# Patient Record
Sex: MALE | Race: WHITE | Employment: UNEMPLOYED | URBAN - METROPOLITAN AREA
[De-identification: names, ages, dates, MRNs, and addresses within clinical notes are randomized per-mention and may not be internally consistent; named-entity substitution may affect disease eponyms.]

---

## 2021-08-08 ENCOUNTER — HOSPITAL ENCOUNTER (INPATIENT)
Age: 86
LOS: 4 days | Discharge: NURSING, REHAB OR LONG-TERM CARE | DRG: 871 | End: 2021-08-13
Attending: EMERGENCY MEDICINE | Admitting: INTERNAL MEDICINE
Payer: MEDICARE

## 2021-08-08 DIAGNOSIS — I71.019 CHRONIC DISSECTION OF THORACIC AORTA: ICD-10-CM

## 2021-08-08 DIAGNOSIS — K56.609 LARGE BOWEL OBSTRUCTION (HCC): Primary | ICD-10-CM

## 2021-08-08 DIAGNOSIS — R77.8 ELEVATED TROPONIN: ICD-10-CM

## 2021-08-08 PROCEDURE — 99283 EMERGENCY DEPT VISIT LOW MDM: CPT

## 2021-08-08 PROCEDURE — 96374 THER/PROPH/DIAG INJ IV PUSH: CPT

## 2021-08-08 RX ORDER — ONDANSETRON 2 MG/ML
4 INJECTION INTRAMUSCULAR; INTRAVENOUS ONCE
Status: COMPLETED | OUTPATIENT
Start: 2021-08-09 | End: 2021-08-09

## 2021-08-08 RX ORDER — 0.9 % SODIUM CHLORIDE 0.9 %
1000 INTRAVENOUS SOLUTION INTRAVENOUS ONCE
Status: COMPLETED | OUTPATIENT
Start: 2021-08-09 | End: 2021-08-09

## 2021-08-09 ENCOUNTER — APPOINTMENT (OUTPATIENT)
Dept: GENERAL RADIOLOGY | Age: 86
DRG: 871 | End: 2021-08-09
Payer: MEDICARE

## 2021-08-09 ENCOUNTER — APPOINTMENT (OUTPATIENT)
Dept: CT IMAGING | Age: 86
DRG: 871 | End: 2021-08-09
Payer: MEDICARE

## 2021-08-09 PROBLEM — K56.609 LARGE BOWEL OBSTRUCTION (HCC): Status: ACTIVE | Noted: 2021-08-09

## 2021-08-09 LAB
ALBUMIN SERPL-MCNC: 3.6 GM/DL (ref 3.4–5)
ALP BLD-CCNC: 196 IU/L (ref 40–128)
ALT SERPL-CCNC: 9 U/L (ref 10–40)
ANION GAP SERPL CALCULATED.3IONS-SCNC: 16 MMOL/L (ref 4–16)
AST SERPL-CCNC: 15 IU/L (ref 15–37)
BACTERIA: NEGATIVE /HPF
BASOPHILS ABSOLUTE: 0 K/CU MM
BASOPHILS RELATIVE PERCENT: 0.2 % (ref 0–1)
BILIRUB SERPL-MCNC: 0.2 MG/DL (ref 0–1)
BILIRUBIN URINE: NEGATIVE MG/DL
BLOOD, URINE: ABNORMAL
BUN BLDV-MCNC: 12 MG/DL (ref 6–23)
CALCIUM SERPL-MCNC: 8.9 MG/DL (ref 8.3–10.6)
CHLORIDE BLD-SCNC: 101 MMOL/L (ref 99–110)
CLARITY: CLEAR
CO2: 25 MMOL/L (ref 21–32)
COLOR: YELLOW
CREAT SERPL-MCNC: 0.8 MG/DL (ref 0.9–1.3)
DIFFERENTIAL TYPE: ABNORMAL
EOSINOPHILS ABSOLUTE: 0 K/CU MM
EOSINOPHILS RELATIVE PERCENT: 0.2 % (ref 0–3)
GFR AFRICAN AMERICAN: >60 ML/MIN/1.73M2
GFR NON-AFRICAN AMERICAN: >60 ML/MIN/1.73M2
GLUCOSE BLD-MCNC: 117 MG/DL (ref 70–99)
GLUCOSE, URINE: NEGATIVE MG/DL
HCT VFR BLD CALC: 48.3 % (ref 42–52)
HEMOGLOBIN: 15.8 GM/DL (ref 13.5–18)
HYALINE CASTS: 0 /LPF
IMMATURE NEUTROPHIL %: 0.5 % (ref 0–0.43)
KETONES, URINE: ABNORMAL MG/DL
LACTATE: 1.7 MMOL/L (ref 0.4–2)
LACTATE: 2.7 MMOL/L (ref 0.4–2)
LEUKOCYTE ESTERASE, URINE: NEGATIVE
LIPASE: 23 IU/L (ref 13–60)
LV EF: 53 %
LVEF MODALITY: NORMAL
LYMPHOCYTES ABSOLUTE: 1.2 K/CU MM
LYMPHOCYTES RELATIVE PERCENT: 9.7 % (ref 24–44)
MCH RBC QN AUTO: 30.7 PG (ref 27–31)
MCHC RBC AUTO-ENTMCNC: 32.7 % (ref 32–36)
MCV RBC AUTO: 93.8 FL (ref 78–100)
MONOCYTES ABSOLUTE: 0.5 K/CU MM
MONOCYTES RELATIVE PERCENT: 4 % (ref 0–4)
MUCUS: ABNORMAL HPF
NITRITE URINE, QUANTITATIVE: NEGATIVE
NUCLEATED RBC %: 0 %
PDW BLD-RTO: 14.8 % (ref 11.7–14.9)
PH, URINE: 5 (ref 5–8)
PLATELET # BLD: 267 K/CU MM (ref 140–440)
PMV BLD AUTO: 10 FL (ref 7.5–11.1)
POTASSIUM SERPL-SCNC: 3.3 MMOL/L (ref 3.5–5.1)
PROCALCITONIN: 0.04
PROTEIN UA: NEGATIVE MG/DL
RBC # BLD: 5.15 M/CU MM (ref 4.6–6.2)
RBC URINE: 5 /HPF (ref 0–3)
SEGMENTED NEUTROPHILS ABSOLUTE COUNT: 10.8 K/CU MM
SEGMENTED NEUTROPHILS RELATIVE PERCENT: 85.4 % (ref 36–66)
SODIUM BLD-SCNC: 142 MMOL/L (ref 135–145)
SPECIFIC GRAVITY UA: 1.03 (ref 1–1.03)
SQUAMOUS EPITHELIAL: <1 /HPF
TOTAL IMMATURE NEUTOROPHIL: 0.06 K/CU MM
TOTAL NUCLEATED RBC: 0 K/CU MM
TOTAL PROTEIN: 6.9 GM/DL (ref 6.4–8.2)
TRICHOMONAS: ABNORMAL /HPF
TROPONIN T: 0.01 NG/ML
TROPONIN T: 0.01 NG/ML
UNCLASSIFIED CAST: 3 /LPF
UROBILINOGEN, URINE: NEGATIVE MG/DL (ref 0.2–1)
WBC # BLD: 12.6 K/CU MM (ref 4–10.5)
WBC UA: 2 /HPF (ref 0–2)

## 2021-08-09 PROCEDURE — 2580000003 HC RX 258: Performed by: EMERGENCY MEDICINE

## 2021-08-09 PROCEDURE — 6360000002 HC RX W HCPCS: Performed by: INTERNAL MEDICINE

## 2021-08-09 PROCEDURE — 99221 1ST HOSP IP/OBS SF/LOW 40: CPT | Performed by: SURGERY

## 2021-08-09 PROCEDURE — 6360000004 HC RX CONTRAST MEDICATION: Performed by: EMERGENCY MEDICINE

## 2021-08-09 PROCEDURE — 83605 ASSAY OF LACTIC ACID: CPT

## 2021-08-09 PROCEDURE — 87086 URINE CULTURE/COLONY COUNT: CPT

## 2021-08-09 PROCEDURE — 84145 PROCALCITONIN (PCT): CPT

## 2021-08-09 PROCEDURE — 83690 ASSAY OF LIPASE: CPT

## 2021-08-09 PROCEDURE — 85025 COMPLETE CBC W/AUTO DIFF WBC: CPT

## 2021-08-09 PROCEDURE — 6360000002 HC RX W HCPCS: Performed by: EMERGENCY MEDICINE

## 2021-08-09 PROCEDURE — 87040 BLOOD CULTURE FOR BACTERIA: CPT

## 2021-08-09 PROCEDURE — 99222 1ST HOSP IP/OBS MODERATE 55: CPT | Performed by: INTERNAL MEDICINE

## 2021-08-09 PROCEDURE — 6370000000 HC RX 637 (ALT 250 FOR IP): Performed by: INTERNAL MEDICINE

## 2021-08-09 PROCEDURE — 81001 URINALYSIS AUTO W/SCOPE: CPT

## 2021-08-09 PROCEDURE — 93005 ELECTROCARDIOGRAM TRACING: CPT | Performed by: EMERGENCY MEDICINE

## 2021-08-09 PROCEDURE — 94761 N-INVAS EAR/PLS OXIMETRY MLT: CPT

## 2021-08-09 PROCEDURE — 2060000000 HC ICU INTERMEDIATE R&B

## 2021-08-09 PROCEDURE — 93306 TTE W/DOPPLER COMPLETE: CPT

## 2021-08-09 PROCEDURE — 84484 ASSAY OF TROPONIN QUANT: CPT

## 2021-08-09 PROCEDURE — 71045 X-RAY EXAM CHEST 1 VIEW: CPT

## 2021-08-09 PROCEDURE — 36415 COLL VENOUS BLD VENIPUNCTURE: CPT

## 2021-08-09 PROCEDURE — 2580000003 HC RX 258: Performed by: INTERNAL MEDICINE

## 2021-08-09 PROCEDURE — 80053 COMPREHEN METABOLIC PANEL: CPT

## 2021-08-09 PROCEDURE — 74177 CT ABD & PELVIS W/CONTRAST: CPT

## 2021-08-09 RX ORDER — ACETAMINOPHEN 500 MG
500 TABLET ORAL EVERY 6 HOURS PRN
COMMUNITY

## 2021-08-09 RX ORDER — ONDANSETRON 2 MG/ML
4 INJECTION INTRAMUSCULAR; INTRAVENOUS EVERY 6 HOURS PRN
Status: DISCONTINUED | OUTPATIENT
Start: 2021-08-09 | End: 2021-08-13 | Stop reason: HOSPADM

## 2021-08-09 RX ORDER — SODIUM CHLORIDE 9 MG/ML
25 INJECTION, SOLUTION INTRAVENOUS PRN
Status: DISCONTINUED | OUTPATIENT
Start: 2021-08-09 | End: 2021-08-09 | Stop reason: SDUPTHER

## 2021-08-09 RX ORDER — POLYETHYLENE GLYCOL 3350 17 G/17G
17 POWDER, FOR SOLUTION ORAL DAILY PRN
Status: DISCONTINUED | OUTPATIENT
Start: 2021-08-09 | End: 2021-08-13 | Stop reason: HOSPADM

## 2021-08-09 RX ORDER — SODIUM CHLORIDE 0.9 % (FLUSH) 0.9 %
5-40 SYRINGE (ML) INJECTION EVERY 12 HOURS SCHEDULED
Status: DISCONTINUED | OUTPATIENT
Start: 2021-08-09 | End: 2021-08-13 | Stop reason: HOSPADM

## 2021-08-09 RX ORDER — DOCUSATE SODIUM 100 MG/1
200 CAPSULE, LIQUID FILLED ORAL NIGHTLY
COMMUNITY

## 2021-08-09 RX ORDER — MIDODRINE HYDROCHLORIDE 5 MG/1
10 TABLET ORAL
Status: DISCONTINUED | OUTPATIENT
Start: 2021-08-09 | End: 2021-08-13 | Stop reason: HOSPADM

## 2021-08-09 RX ORDER — ATORVASTATIN CALCIUM 40 MG/1
40 TABLET, FILM COATED ORAL NIGHTLY
COMMUNITY

## 2021-08-09 RX ORDER — POTASSIUM CHLORIDE 1500 MG/1
40 TABLET, FILM COATED, EXTENDED RELEASE ORAL
COMMUNITY

## 2021-08-09 RX ORDER — SODIUM CHLORIDE 0.9 % (FLUSH) 0.9 %
5-40 SYRINGE (ML) INJECTION 2 TIMES DAILY
Status: DISCONTINUED | OUTPATIENT
Start: 2021-08-09 | End: 2021-08-13 | Stop reason: HOSPADM

## 2021-08-09 RX ORDER — MIDODRINE HYDROCHLORIDE 5 MG/1
10 TABLET ORAL 3 TIMES DAILY
Status: ON HOLD | COMMUNITY
End: 2021-08-13 | Stop reason: SDUPTHER

## 2021-08-09 RX ORDER — POLYETHYLENE GLYCOL 3350 17 G/17G
17 POWDER, FOR SOLUTION ORAL 2 TIMES DAILY
COMMUNITY

## 2021-08-09 RX ORDER — SERTRALINE HYDROCHLORIDE 25 MG/1
25 TABLET, FILM COATED ORAL DAILY
COMMUNITY

## 2021-08-09 RX ORDER — ACETAMINOPHEN 650 MG/1
650 SUPPOSITORY RECTAL EVERY 6 HOURS PRN
Status: DISCONTINUED | OUTPATIENT
Start: 2021-08-09 | End: 2021-08-13 | Stop reason: HOSPADM

## 2021-08-09 RX ORDER — AMOXICILLIN 250 MG
1 CAPSULE ORAL EVERY 12 HOURS PRN
COMMUNITY

## 2021-08-09 RX ORDER — SODIUM CHLORIDE 9 MG/ML
25 INJECTION, SOLUTION INTRAVENOUS PRN
Status: DISCONTINUED | OUTPATIENT
Start: 2021-08-09 | End: 2021-08-13 | Stop reason: HOSPADM

## 2021-08-09 RX ORDER — PHENYTOIN SODIUM 100 MG/1
200 CAPSULE, EXTENDED RELEASE ORAL 2 TIMES DAILY
Status: ON HOLD | COMMUNITY
End: 2021-08-13

## 2021-08-09 RX ORDER — MIRTAZAPINE 15 MG/1
15 TABLET, FILM COATED ORAL NIGHTLY
COMMUNITY

## 2021-08-09 RX ORDER — SODIUM CHLORIDE AND POTASSIUM CHLORIDE .9; .15 G/100ML; G/100ML
SOLUTION INTRAVENOUS CONTINUOUS
Status: DISCONTINUED | OUTPATIENT
Start: 2021-08-09 | End: 2021-08-13 | Stop reason: HOSPADM

## 2021-08-09 RX ORDER — NITROGLYCERIN 0.4 MG/1
0.4 TABLET SUBLINGUAL EVERY 5 MIN PRN
COMMUNITY

## 2021-08-09 RX ORDER — SODIUM CHLORIDE 0.9 % (FLUSH) 0.9 %
5-40 SYRINGE (ML) INJECTION PRN
Status: DISCONTINUED | OUTPATIENT
Start: 2021-08-09 | End: 2021-08-13 | Stop reason: HOSPADM

## 2021-08-09 RX ORDER — ACETAMINOPHEN 325 MG/1
650 TABLET ORAL EVERY 6 HOURS PRN
Status: DISCONTINUED | OUTPATIENT
Start: 2021-08-09 | End: 2021-08-13 | Stop reason: HOSPADM

## 2021-08-09 RX ORDER — VANCOMYCIN 1.75 G/350ML
1250 INJECTION, SOLUTION INTRAVENOUS ONCE
Status: COMPLETED | OUTPATIENT
Start: 2021-08-09 | End: 2021-08-09

## 2021-08-09 RX ORDER — 0.9 % SODIUM CHLORIDE 0.9 %
500 INTRAVENOUS SOLUTION INTRAVENOUS ONCE
Status: COMPLETED | OUTPATIENT
Start: 2021-08-09 | End: 2021-08-09

## 2021-08-09 RX ORDER — VANCOMYCIN 1.5 G/300ML
1500 INJECTION, SOLUTION INTRAVENOUS ONCE
Status: DISCONTINUED | OUTPATIENT
Start: 2021-08-09 | End: 2021-08-09

## 2021-08-09 RX ORDER — ONDANSETRON 4 MG/1
4 TABLET, FILM COATED ORAL EVERY 8 HOURS PRN
COMMUNITY

## 2021-08-09 RX ORDER — ONDANSETRON 4 MG/1
4 TABLET, ORALLY DISINTEGRATING ORAL EVERY 8 HOURS PRN
Status: DISCONTINUED | OUTPATIENT
Start: 2021-08-09 | End: 2021-08-13 | Stop reason: HOSPADM

## 2021-08-09 RX ORDER — CHOLECALCIFEROL (VITAMIN D3) 1250 MCG
1 CAPSULE ORAL
COMMUNITY

## 2021-08-09 RX ORDER — BISACODYL 10 MG
10 SUPPOSITORY, RECTAL RECTAL PRN
COMMUNITY

## 2021-08-09 RX ORDER — VANCOMYCIN 1 G/200ML
1000 INJECTION, SOLUTION INTRAVENOUS EVERY 24 HOURS
Status: DISCONTINUED | OUTPATIENT
Start: 2021-08-10 | End: 2021-08-11

## 2021-08-09 RX ADMIN — SODIUM CHLORIDE, PRESERVATIVE FREE 10 ML: 5 INJECTION INTRAVENOUS at 04:30

## 2021-08-09 RX ADMIN — SODIUM CHLORIDE, PRESERVATIVE FREE 10 ML: 5 INJECTION INTRAVENOUS at 08:27

## 2021-08-09 RX ADMIN — POTASSIUM CHLORIDE AND SODIUM CHLORIDE: 900; 150 INJECTION, SOLUTION INTRAVENOUS at 20:10

## 2021-08-09 RX ADMIN — SODIUM CHLORIDE 500 ML: 9 INJECTION, SOLUTION INTRAVENOUS at 04:28

## 2021-08-09 RX ADMIN — VANCOMYCIN 1250 MG: 1.75 INJECTION, SOLUTION INTRAVENOUS at 06:42

## 2021-08-09 RX ADMIN — ONDANSETRON 4 MG: 2 INJECTION INTRAMUSCULAR; INTRAVENOUS at 01:18

## 2021-08-09 RX ADMIN — MIDODRINE HYDROCHLORIDE 10 MG: 5 TABLET ORAL at 17:06

## 2021-08-09 RX ADMIN — MIDODRINE HYDROCHLORIDE 10 MG: 5 TABLET ORAL at 08:26

## 2021-08-09 RX ADMIN — MIDODRINE HYDROCHLORIDE 10 MG: 5 TABLET ORAL at 12:07

## 2021-08-09 RX ADMIN — SODIUM CHLORIDE 1000 ML: 9 INJECTION, SOLUTION INTRAVENOUS at 01:18

## 2021-08-09 RX ADMIN — CEFEPIME HYDROCHLORIDE 1000 MG: 1 INJECTION, POWDER, FOR SOLUTION INTRAMUSCULAR; INTRAVENOUS at 17:06

## 2021-08-09 RX ADMIN — FOSPHENYTOIN SODIUM 200 MG PE: 50 INJECTION, SOLUTION INTRAMUSCULAR; INTRAVENOUS at 09:17

## 2021-08-09 RX ADMIN — CEFEPIME HYDROCHLORIDE 2000 MG: 2 INJECTION, POWDER, FOR SOLUTION INTRAVENOUS at 04:25

## 2021-08-09 RX ADMIN — POTASSIUM CHLORIDE AND SODIUM CHLORIDE: 900; 150 INJECTION, SOLUTION INTRAVENOUS at 06:46

## 2021-08-09 RX ADMIN — SODIUM CHLORIDE, PRESERVATIVE FREE 10 ML: 5 INJECTION INTRAVENOUS at 08:34

## 2021-08-09 RX ADMIN — FOSPHENYTOIN SODIUM 200 MG PE: 50 INJECTION, SOLUTION INTRAMUSCULAR; INTRAVENOUS at 20:19

## 2021-08-09 RX ADMIN — IOPAMIDOL 75 ML: 755 INJECTION, SOLUTION INTRAVENOUS at 02:30

## 2021-08-09 NOTE — PROGRESS NOTES
[address] [facility]  consulted by Dr. Anais Gleason for monitoring and adjustment. Indication for treatment: Sepsis of unknown etiology  Goal trough: 15 mcg/mL  AUC/RODRIGUE: 400-600    Pertinent Laboratory Values:   Temp Readings from Last 3 Encounters:   08/09/21 98 °F (36.7 °C) (Oral)     Recent Labs     08/09/21  0114   WBC 12.6*   LACTATE 2.7*     Recent Labs     08/09/21  0114   BUN 12   CREATININE 0.8*     Estimated Creatinine Clearance: 51 mL/min (A) (based on SCr of 0.8 mg/dL (L)). No intake or output data in the 24 hours ending 08/09/21 0654    Pertinent Cultures:  Date    Source    Results  8/09               Urine    Ordered  8/09               Blood    Ordered    Vancomycin level:   TROUGH:  No results for input(s): VANCOTROUGH in the last 72 hours. RANDOM:  No results for input(s): VANCORANDOM in the last 72 hours. Assessment:  WBC and temperature: WBC elevated, afebrile  SCr, BUN, and urine output: stable  Day(s) of therapy:1  Vancomycin concentration: Pending    Plan:  Vancomycin 1250 mg x 1, followed by 1000 mg IVPB q24h  Pharmacy will continue to monitor patient and adjust therapy as indicated    Paulyu 3 8/11/21 @6130    Thank you for the consult.   Rajesh Paz, [facility], [facility]   8/9/2021 6:54 AM

## 2021-08-09 NOTE — H&P
HISTORY AND PHYSICAL  (Hospitalist, Internal Medicine)  IDENTIFYING INFORMATION   PATIENT:  Douglas Brunner  MRN:  [id number]  ADMIT DATE: 8/8/2021      CHIEF COMPLAINT   Transferred from [facility] for nausea, vomiting, diarrhea. HISTORY OF PRESENT ILLNESS   Douglas Brunner is a 80 y.o. male with coronary artery disease s/p CABG (2001LIMA to LAD, SVG to OM1, SVG to OM2) , chronic diastolic CHF, paroxysmal atrial fibrillation, multiple CVA, possible PFO and left atrial appendage thrombus on DANIELLA (2016), moderate aortic stenosis, seizure disorder, chronic hypotension, hyperlipidemia, dementia, major depressive disorder, currently at [facility] was sent to ED for nausea, vomiting, diarrhea. Patient is unable to provide any information. Reported he is doing fine. Denied any chest pain, shortness of breath, complaint of abdominal pain-pointing out to his epigastric area, denied any nausea, vomiting, denied any urinary complaints, denied any constipation or diarrhea. Patient is oriented to self, though understands questions and follows commands. Did not know that he is in the hospital, did not know the year, month, date. Knew his month and date of birth, did not know the year. Could recall his son and daughter's name. Thought his wife was in the room and was trying to talk to her. Called [facility]-no response. Also tried to call Pt's daughter Jarrell Johns [phone] on the paperwork from [facility]-left a voice message). Also tried calling multiple numbers which were documented during a cardiology office visit-(4/7/2021-[facility])-but was unsuccessful. Initial vitals in ED-BP 92/69, HR 98, RR 15, temp 99.8, saturating 92% on room air. Lab work significant for potassium 3.3, lactic acid 2.7, random glucose 117, troponin 0 0.012, WBC 12.6.   Chest x-ray-markedly distended air-filled loops of bowel within the upper abdomen. CT abdomen/pelvis-circumferential thickening of the rectum concerning for neoplasm-resulting in large bowel obstruction. 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 Urine analysis pending, blood cultures drawn in ED. Patient received IV fluids, vancomycin, cefepime. Dr Leeanne Torres was consulted from ED. PAST MEDICAL HISTORY PAST SURGICAL HISTORY    coronary artery disease s/p CABG (2001LIMA to LAD, SVG to OM1, SVG to OM2) , chronic diastolic CHF, paroxysmal atrial fibrillation, multiple CVA, possible PFO and left atrial appendage thrombus on DANIELLA (2016), moderate aortic stenosis, seizure disorder, chronic hypotension, hyperlipidemia, dementia, major depressive disorder, currently at [facility] CABG, Right inguinal herniorrhaphy with mesh-2009   FAMILY HISTORY SOCIAL HISTORY   Noncontributory   patient denied smoking, alcohol, illicit drug use   MEDICATIONS ALLERGIES    Reviewed medications from NH-is on vitamin D3 5000 units daily, midodrine 10 mg 3 times daily, sertraline 25 mg daily, MiraLAX twice daily, senna docusate twice daily as needed, atorvastatin 40 mg nightly, Eliquis 5 mg twice daily, mirtazapine 50 mg nightly, phenytoin 200 mg twice daily, potassium chloride 40 mEq daily, milk of mag as needed. Nabumetone, oxycodone.        PAST MEDICAL, SURGICAL, FAMILY, and SOCIAL HISTORY         Past Medical History:   Diagnosis Date    Alzheimer disease ([facility] [address]     AMS (altered mental status)     Arthritis     CAD (coronary artery disease)     CHF (congestive heart failure) (HCC)     Depression     Difficulty walking     Encephalopathy     Gastroenteritis     Hyperlipidemia     Hypertension     Hypoxemia     Ileus (HCC)     Malnutrition (HCC)     Muscle weakness     Osteoporosis     Seizures (HCC)     TIA (transient ischemic attack)      No past surgical history on file. No family history on file. Family Hx of HTN  Family Hx as reviewed above, otherwise non-contributory  Social History     Socioeconomic History    Marital status: Single     Spouse name: Not on file    Number of children: Not on file    Years of education: Not on file    Highest education level: Not on file   Occupational History    Not on file   Tobacco Use    Smoking status: Not on file   Substance and Sexual Activity    Alcohol use: Not on file    Drug use: Not on file    Sexual activity: Not on file   Other Topics Concern    Not on file   Social History Narrative    Not on file     Social Determinants of Health     Financial Resource Strain:     Difficulty of Paying Living Expenses:    Food Insecurity:     Worried About Running Out of Food in the Last Year:     [address] in the Last Year:    Transportation Needs:     Lack of Transportation (Medical):  Lack of Transportation (Non-Medical):    Physical Activity:     Days of Exercise per Week:     Minutes of Exercise per Session:    Stress:     Feeling of Stress :    Social Connections:     Frequency of Communication with Friends and Family:     Frequency of Social Gatherings with Friends and Family:     Attends [employer] Services:     Active Member of Clubs or Organizations:     Attends Club or Organization Meetings:     Marital Status:    Intimate Partner Violence:     Fear of Current or Ex-Partner:     Emotionally Abused:     Physically Abused:     Sexually Abused:        MEDICATIONS   Medications Prior to Admission  Not in a hospital admission.     Current Medications  Current Facility-Administered Medications   Medication Dose Route Frequency Provider Last Rate Last Admin    sodium chloride flush 0.9 % injection 5-40 mL  5-40 mL Intravenous BID Renata Snowden MD        0.9 % sodium chloride bolus  500 mL Intravenous Once Renata Snwoden MD        sodium chloride flush 0.9 % injection 5-40 mL  5-40 mL Intravenous 2 times per day Pierre Kent MD        sodium chloride flush 0.9 % injection 5-40 mL  5-40 mL Intravenous PRN Pierre Kent MD        0.9 % sodium chloride infusion  25 mL Intravenous PRN Pierre Kent MD        cefepime (MAXIPIME) 2000 mg IVPB minibag  2,000 mg Intravenous Once Pierre Kent MD        vancomycin (VANCOCIN) 25 mg/kg in dextrose 5 % 250 mL IVPB  25 mg/kg Intravenous Once Pierre Kent MD         Current Outpatient Medications   Medication Sig Dispense Refill    Cholecalciferol (VITAMIN D3) 125 MCG (5000 UT) TABS Take 1 tablet by mouth daily      ondansetron (ZOFRAN) 4 MG tablet Take 4 mg by mouth every 8 hours as needed for Nausea or Vomiting      midodrine (PROAMATINE) 5 MG tablet Take 10 mg by mouth 3 times daily      docusate sodium (COLACE) 100 MG capsule Take 200 mg by mouth nightly      sertraline (ZOLOFT) 25 MG tablet Take 25 mg by mouth daily      polyethylene glycol (MIRALAX) 17 g PACK packet Take 17 g by mouth 2 times daily      senna-docusate (PERICOLACE) 8.6-50 MG per tablet Take 1 tablet by mouth every 12 hours as needed for Constipation      atorvastatin (LIPITOR) 40 MG tablet Take 40 mg by mouth nightly      apixaban (ELIQUIS) 5 MG TABS tablet Take by mouth 2 times daily      mirtazapine (REMERON) 15 MG tablet Take 15 mg by mouth nightly      phenytoin (DILANTIN) 100 MG ER capsule Take by mouth 2 times daily      potassium chloride (KLOR-CON M) 20 MEQ TBCR extended release tablet Take 40 mEq by mouth daily (with breakfast)      nitroGLYCERIN (NITROSTAT) 0.4 MG SL tablet Place 0.4 mg under the tongue every 5 minutes as needed for Chest pain up to max of 3 total doses. If no relief after 1 dose, call 911.       magnesium hydroxide (MILK OF MAGNESIA) 400 MG/5ML suspension Take 30 mLs by mouth daily as needed for Constipation      acetaminophen (TYLENOL) 500 MG tablet Take 500 mg by mouth every 6 hours as needed for Pain      bisacodyl (DULCOLAX) 10 MG suppository Place 10 mg rectally as needed for Constipation (if day 4 with no BM)           Allergies  Allergies   Allergen Reactions    Nabumetone Other (See Comments)     unknown    Oxycodone Other (See Comments)     unknown       REVIEW OF SYSTEMS   10 point review of systems conducted and pertinent positives and negatives as per HPI. Patient is oriented to self and hence review of systems might be unreliable. PHYSICAL EXAM     Wt Readings from Last 3 Encounters:   No data found for Wt       Blood pressure 92/69, pulse 98, temperature 99.8 °F (37.7 °C), resp. rate 15, SpO2 92 %. GEN  -Awake, alert, NAD.   EYES   -PERRL. HENT  -MM are dry. RESP  -LS CTA equal bilat, no wheezes, rales or rhonchi. Symmetric chest movement. No respiratory distress noted. C/V  -S1/S2 auscultated, tachycardia without appreciable M/R/G. No JVD or carotid bruits. Peripheral pulses equal bilaterally and palpable. No peripheral edema. No reproducible chest wall tenderness. GI  -Abdomen is soft, distended, no significant tenderness. No masses or guarding. + BS in all quadrants. Rectal exam deferred.   -No CVA tenderness. Pierre catheter is not present. MS  -B/L extremities - No gross joint deformities. No swelling, intact sensation symmetrical.   SKIN  -Normal coloration, warm, dry. NEURO  - Awake, alert, oriented to self, moving all extremities, following commands. PSYC  - Appropriate affect. LABS AND IMAGING     Results for Chintan Gerber (MRN [id number]) as of 8/9/2021 03:37   Ref.  Range 8/9/2021 01:14   Sodium Latest Ref Range: 135 - 145 MMOL/L 142   Potassium Latest Ref Range: 3.5 - 5.1 MMOL/L 3.3 (L)   Chloride Latest Ref Range: 99 - 110 mMol/L 101   CO2 Latest Ref Range: 21 - 32 MMOL/L 25   BUN Latest Ref Range: 6 - 23 MG/DL 12   Creatinine Latest Ref Range: 0.9 - 1.3 MG/DL 0.8 (L)   Anion Gap Latest Ref Range: 4 - 16  16   GFR Non- Latest Ref Range: >60 mL/min/1.73m2 >60   GFR African American Latest Ref Range: >60 mL/min/1.73m2 >60   Lactate, ser/plas Latest Ref Range: 0.4 - 2.0 mMOL/L 2.7 (HH)   Glucose Latest Ref Range: 70 - 99 MG/ (H)   Calcium Latest Ref Range: 8.3 - 10.6 MG/DL 8.9   Total Protein Latest Ref Range: 6.4 - 8.2 GM/DL 6.9   Troponin T Latest Ref Range: <0.01 NG/ML 0.012 (H)   Albumin Latest Ref Range: 3.4 - 5.0 GM/DL 3.6   Alk Phos Latest Ref Range: 40 - 128 IU/L 196 (H)   ALT Latest Ref Range: 10 - 40 U/L 9 (L)   AST Latest Ref Range: 15 - 37 IU/L 15   Bilirubin Latest Ref Range: 0.0 - 1.0 MG/DL 0.2   Lipase Latest Ref Range: 13 - 60 IU/L 23   WBC Latest Ref Range: 4.0 - 10.5 K/CU MM 12.6 (H)   RBC Latest Ref Range: 4.6 - 6.2 M/CU MM 5.15   Hemoglobin Quant Latest Ref Range: 13.5 - 18.0 GM/DL 15.8   Hematocrit Latest Ref Range: 42 - 52 % 48.3   MCV Latest Ref Range: 78 - 100 FL 93.8   MCH Latest Ref Range: 27 - 31 PG 30.7   MCHC Latest Ref Range: 32.0 - 36.0 % 32.7   MPV Latest Ref Range: 7.5 - 11.1 FL 10.0   RDW Latest Ref Range: 11.7 - 14.9 % 14.8   Platelet Count Latest Ref Range: 140 - 440 K/CU    Lymphocyte % Latest Ref Range: 24 - 44 % 9.7 (L)   Monocytes % Latest Ref Range: 0 - 4 % 4.0   Eosinophils % Latest Ref Range: 0 - 3 % 0.2   Basophils % Latest Ref Range: 0 - 1 % 0.2   Lymphocytes Absolute Latest Units: K/CU MM 1.2   Monocytes Absolute Latest Units: K/CU MM 0.5   Eosinophils Absolute Latest Units: K/CU MM 0.0   Basophils Absolute Latest Units: K/CU MM 0.0   Differential Type Unknown AUTOMATED DIFFERENTIAL   Segs Relative Latest Ref Range: 36 - 66 % 85.4 (H)   Segs Absolute Latest Units: K/CU MM 10.8   Nucleated RBC % Latest Units: % 0.0   Immature Neutrophil % Latest Ref Range: 0 - 0.43 % 0.5 (H)   Total Immature Neutrophil Latest Units: K/CU MM 0.06   Total Nucleated RBC Latest Units: K/CU MM 0.0     Recent Imaging    CT ABDOMEN PELVIS W IV CONTRAST Additional Contrast? None [[id number]] Collected: 08/09/21 0245      Order Status: Completed Updated: 08/09/21 0303     Impression:       1. There is circumferential thickening of the rectum, concerning for a   neoplasm.  This results in large bowel obstruction. 2. 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 3. Focal calcified atherosclerotic plaque involving the aorta at the level of   the SMA resulting in severe stenosis.  The SMA is patent. 4. Severe stenosis is seen involving the proximal bilateral common iliac   arteries.      XR CHEST PORTABLE [[id number]] Collected: 08/09/21 0039     Order Status: Completed Updated: 08/09/21 0044     Narrative:       EXAMINATION:   ONE XRAY VIEW OF THE CHEST     8/8/2021 6:34 pm     COMPARISON:   None. HISTORY:   ORDERING SYSTEM PROVIDED HISTORY: distended abdomen   TECHNOLOGIST PROVIDED HISTORY:   Reason for exam:->distended abdomen   Reason for Exam: distended abdomen   Acuity: Acute   Type of Exam: Initial     FINDINGS:   Marginal inspiration is present.  Linear opacity is present within the left   lung base, suggesting atelectasis or scarring.  Heart size is normal full   level of inspiration.  No pneumothorax is present.  Vascular markings are   distinct.  Patient is status post prior CABG.  Osseous structures are   osteopenic.  Multiple air filled, distended loops of bowel are present within   the upper abdomen.  Colonic interposition is noted beneath the right   hemidiaphragm.      Impression:       1. Markedly distended air-filled loops of bowel within the upper abdomen.    Dedicated imaging of the abdomen pelvis recommended as concern is for   obstruction.          Relevant labs and imaging reviewed    ASSESSMENT AND PLAN     #.  Bowel obstruction:  -Abdomen x-ray-markedly distended air-filled loops of bowel within the upper abdomen  -CT abdomen/pelvis-circumferential thickening of the rectum concerning for neoplasm-resulting in large bowel obstruction.  -Patient not actively vomiting, though has abdominal distention, denied any pain, not tender on palpation.  -Dr Dolly Padgett consulted from ED-who recommended consulting the GI. -GI consult placed.  -Patient to be n.p.o. until evaluated by general surgery, GI  -Continue IV fluids, antiemetics, analgesics as needed. #.  SIRS positive :  -Patient tachycardic, has leukocytosis, temp 99.8, lactic acidosis  -Chest x-ray-no acute infiltrate, blood cultures drawn in ER.   -UA pending.   -Continue broad-spectrum antibiotics empirically.   -Procalcitonin ordered. #.  Detectable troponin:  -Patient denied any chest pain  -Troponin 0.012, EKG with no acute ST-T wave changes.  -Serial troponins, repeat EKG  -2D echo ordered  -Consult cardiology. #.  Chronic type B dissection  -CT abdomen/pelvis with contrast reported- focal thrombosis likely chronic type B dissection involving the distal descending thoracic aorta. Focal calcified atherosclerotic plaque involving the aorta at the level of the SMA resulting in severe stenosis. -CTS consult    #. Peripheral arterial disease:  -CT abdomen/pelvis with IV contrast also reported severe stenosis is seen involving the proximal bilateral common iliac arteries. #.  Hypokalemia: Replace    #. coronary artery disease s/p CABG (2001LIMA to LAD, SVG to OM1, SVG to OM2)  -Continue aspirin, statin    #. chronic diastolic CHF  -Does not appear to be in exacerbation    #. paroxysmal atrial fibrillation  -As per care everywhere-cardiology documentation-4/7/2021  -Patient is on Eliquis  -Hold until evaluated by cardiology, general surgery. #. multiple CVA-does not appear to be having any focal deficits-moving all extremities. -PT evaluation when appropriate.     #. possible PFO and left atrial appendage thrombus on DANIELLA (2016), moderate aortic stenosis  -As per cardiology documentation-4/7/2021    #. seizure disorder  -Seizure precautions  -Continue IV phenytoin 200 mg twice daily    #. chronic hypotension  -Patient on midodrine 10 mg 3 times daily    #. Hyperlipidemia-atorvastatin    #. Dementia    #. major depressive disorder-patient on sertraline    #. currently at [address]    DVT Prophylaxis: Hold until evaluated by consultants.   GI Prophylaxis: Protonix  Code Status: Limited-as per NH documentation      Case d/w ED physician    Heidi Quiñones MD  Hospitalist, Internal Medicine  8/9/2021 at 3:50 AM

## 2021-08-09 NOTE — CARE COORDINATION
Attempted to meet with patient ; he is alert and pleasant but confused. He believes that he is at home and this CM is a caregiver. Will contact daughter Lizbeth Asencio at [phone]. Renata Stevens RN     1030 Attempted to reach daughter Lizbeth Asencio; [facility] for return call. Renata Stevens RN     3803 Attempted to contact [facility]; patient appears to be a resident. [facility] for return call. Renata Stevens RN     8068 Received call from [facility]; spoke to Lulú Petersen. Patient is LTC. If he returns unskilled- will need Covid test only. He he returns skilled (ATB, etc) will need precert.  Renata Stevens RN

## 2021-08-09 NOTE — PROGRESS NOTES
Hospital Medicine: Progress Note     [place]  7/20/1932         Admit Date: 8/8/2021   Primary Care Physician:  No primary care provider on file. BP (!) 76/53   Pulse 85   Temp 96.7 °F (35.9 °C) (Axillary)   Resp 16   Ht 5' 9\" (1.753 m)   Wt 126 lb 12.2 oz (57.5 kg)   SpO2 91%   BMI 18.72 kg/m²     Chief Complaint:    Abdominal pain, nausea and vomiting. Perpetual history:   63-year-old male who presented from [facility] with nausea, vomiting and diarrhea. He was unable to provide information. He was pointing to the epigastric region and complaining of pain. CXR showed markedly distended air-filled loops of bowel within the upper abdomen, contrast CT of the abdomen and pelvis shows circumferential thickening of the rectum concerning for neoplasm. He was admitted for further evaluation. Is currently n.p.o., awaiting GI evaluation. Interval History:    He was seen and examined at bedside, resting in bed, awake and alert, no apparent distress. He is pleasant, alert to self and place but not situation. He denies acute pain, no further nausea or vomiting at this time. Nursing has no concerns. Impression/Plan:    #Nausea and vomiting:  -Probably related to bowel obstruction.  -No longer vomiting at this time.  -Keep n.p.o. for now.  -Continue fluid hydration and electrolyte replacement. -GI was consulted, plan for colonoscopy. #Concern for infectious colitis:  -Check stool studies and C. difficile.  -Continue empiric antibiotics. #SIRS:  -Suspect GI infection.  -Unremarkable CXR. -Continue broad coverage antibiotics. -Await culture data stool studies. #Elevated troponin:  -No CP, pressure. -EKG with no acute ischemic changes.  -Reviewed 2D echocardiogram, EF of 50 to 55%. Normal LV systolic function.  -Evaluated by cardiology, no further work-up at this time.     #Chronic type B dissection:  -Reviewed CT of the abdomen pelvis.  -No acute intervention at this time.  -We will follow

## 2021-08-09 NOTE — CONSULTS
[address] [place] [address]                                  CONSULTATION    PATIENT NAME: Sil Velazquez                   :        1932  MED REC NO:   [id number]                          ROOM:         ACCOUNT NO:   [de-identified]                           ADMIT DATE: 2021  PROVIDER:     Hope Pina MD    CONSULT DATE:  2021    CHIEF COMPLAINT:  History of nausea, vomiting, diarrhea, and abnormal  CAT scan; rule out rectal neoplasm. HISTORY OF PRESENT ILLNESS:  The patient is an 66-year-old white  gentleman, a resident of [place] [address], with past medical  history significant for dementia, coronary artery disease status post  CABG, congestive heart failure, hyperlipidemia, depression,  malnutrition, seizure disorder, hypertension, osteoarthritis, TIAs, who  presented to the emergency room today with two-week history of nausea,  vomiting, and diarrhea. There is no history of hematemesis, melena or  hematochezia. The blood workup done in the emergency room comprised a  Chem profile, which was remarkable for a potassium of 3.3. LFTs were  abnormal with a total alkaline phosphatase of 196. CBC showed a WBC  count of 12.6, hemoglobin 15.8, and platelet count of 043,385. The  patient did have a CAT scan done of the abdomen and pelvis and showed  circumferential thickening of the rectum concerning for a neoplasm. The  patient was seen by the surgical consultant, Dr. Austen Lindsey, as well. The  patient was admitted for further workup and management. After talking to the patient's daughter, _____, the patient has never  had an EGD done, but did have a couple of colonoscopies done. The last  colonoscopy was more than 10 years ago and had colon polyps removed. The patient is hemodynamically stable. REVIEW OF SYSTEMS:  Cannot be assessed since the patient has altered  mental status.     PAST MEDICAL HISTORY:  Significant for history of hypertension, coronary  artery disease status post CABG, dementia, hyperlipidemia, depression,  osteoarthritis/osteoporosis, seizure disorder, and TIAs. FAMILY HISTORY:  Significant for the patient's brother diagnosed with  carcinoma of the colon, sister with carcinoma of the pancreas. MEDICATIONS:  Please refer to chart (the patient is on Eliquis). SOCIOECONOMIC HISTORY:  No history of EtOH abuse. The patient does not  smoke cigarettes. PAST SURGICAL HISTORY:  The patient has had CABG done and bilateral hip  replacements. ALLERGIES:  The patient is allergic to NABUMETONE and OXYCODONE. PHYSICAL EXAMINATION:  GENERAL:  Shows an 49-year-old white gentleman of thin build and poor  nutritional status, who is lying flat in bed, no acute distress. He is  awake and responds to verbal commands. VITAL SIGNS:  Please refer to the chart. HEENT:  Shows skull to be atraumatic. NECK:  Supple. CHEST:  Shows diminished breath sounds. HEART:  S1 and S2 are normal.  ABDOMEN:  Soft, nondistended, and nontender. Liver and spleen are not  palpable. Bowel sounds are present. RECTAL:  Exam is deferred. CNS:  Shows the patient to be awake and responds to verbal commands. The patient is moving all four extremities. MUSCULOSKELETAL SYSTEM:  Shows evidence of degenerative joint disease  changes. LABORATORY DATA:  As above mentioned. IMPRESSION:  An 49-year-old white gentleman, a resident of an extended  care facility, with;  1.  Multiple comorbidities admitted with two-week history of nausea,  vomiting, diarrhea, and abnormal CAT scan, rule out rectal neoplasm. 2.  Rule out infectious colitis. RECOMMENDATIONS:  1. Agree with present management with IV fluids. 2.  We will start the patient on clear-liquid diet, advance as  tolerated.   3.  We will check CBC, Chem profile, amylase, lipase, PT/PTT, INR in  a.m.  4.  We will also check stool studies for GI disease panel and C.  difficile toxin. 5.  The case and plan has been discussed in detail with the patient's  daughter, _____, and she wants to proceed with colonoscopy for further  workup of the rectal abnormality noted on CAT scan. However, she does  not want the patient to have any invasive surgeries.         Kee Andrade MD    D: 08/09/2021 16:18:22       T: 08/09/2021 16:20:43     [email or web address]  Job#: [id number]     Doc#: [id number]    CC:

## 2021-08-09 NOTE — CONSULTS
SURGICAL CONSULTATION    Date of Admission:  8/8/2021  Date of Consultation:  8/9/2021    PCP:  No primary care provider on file. Thank you Dr. Arron Hernandes MD very much for your consultation, it's always appreciated. I had the privilege today to see your patient Bob Wheatley. Chief Complaint / History of Present Illness:  Bob Wheatley is a very pleasant 80 y.o. male who presents with pain in the abdomen, and severe constipation, noted in the care facility where he lives. Pamdini Money demented. Padmini Money and is acute, worsening and dull compared to patient's normal condition. It is moderate in intensity for a duration of 1 week and is continuous with modifying factors increased by or worse with eating. Padmini Russ His CT revealed a large bowel obstruction, and questioning a rectal obstructing mass. . D/W Dr Nini Casanova and he will colonoscope him tomorrow and let me know, I discussed with his daughter, and she wants NO surgical interventions. .      PMH:   has a past medical history of Alzheimer disease ([address] AMS (altered mental status), Arthritis, CAD (coronary artery disease), CHF (congestive heart failure) ([address] Depression, Difficulty walking, Encephalopathy, Gastroenteritis, Hyperlipidemia, Hypertension, Hypoxemia, Ileus ([address] Malnutrition ([address] Muscle weakness, Osteoporosis, Seizures ([address] and TIA (transient ischemic attack). PSH:   has no past surgical history on file. Allergies: Allergies   Allergen Reactions    Nabumetone Other (See Comments)     unknown    Oxycodone Other (See Comments)     unknown        Home Meds:    Prior to Admission medications    Medication Sig Start Date End Date Taking?  Authorizing Provider   Cholecalciferol (VITAMIN D3) 125 MCG (5000 UT) TABS Take 1 tablet by mouth daily   Yes Historical Provider, MD   ondansetron (ZOFRAN) 4 MG tablet Take 4 mg by mouth every 8 hours as needed for Nausea or Vomiting   Yes Historical Provider, MD   midodrine (PROAMATINE) 5 MG tablet Take 10 mg by mouth 3 times daily   Yes Historical Provider, MD   docusate sodium (COLACE) 100 MG capsule Take 200 mg by mouth nightly   Yes Historical Provider, MD   sertraline (ZOLOFT) 25 MG tablet Take 25 mg by mouth daily   Yes Historical Provider, MD   polyethylene glycol (MIRALAX) 17 g PACK packet Take 17 g by mouth 2 times daily   Yes Historical Provider, MD   senna-docusate (Indra Saint) 8.6-50 MG per tablet Take 1 tablet by mouth every 12 hours as needed for Constipation   Yes Historical Provider, MD   atorvastatin (LIPITOR) 40 MG tablet Take 40 mg by mouth nightly   Yes Historical Provider, MD   apixaban (ELIQUIS) 5 MG TABS tablet Take by mouth 2 times daily   Yes Historical Provider, MD   mirtazapine (REMERON) 15 MG tablet Take 15 mg by mouth nightly   Yes Historical Provider, MD   phenytoin (DILANTIN) 100 MG ER capsule Take 200 mg by mouth 2 times daily    Yes Historical Provider, MD   potassium chloride (KLOR-CON M) 20 MEQ TBCR extended release tablet Take 40 mEq by mouth daily (with breakfast)   Yes Historical Provider, MD   nitroGLYCERIN (NITROSTAT) 0.4 MG SL tablet Place 0.4 mg under the tongue every 5 minutes as needed for Chest pain up to max of 3 total doses. If no relief after 1 dose, call 911.    Yes Historical Provider, MD   magnesium hydroxide (MILK OF MAGNESIA) 400 MG/5ML suspension Take 30 mLs by mouth daily as needed for Constipation   Yes Historical Provider, MD   acetaminophen (TYLENOL) 500 MG tablet Take 500 mg by mouth every 6 hours as needed for Pain   Yes Historical Provider, MD   bisacodyl (DULCOLAX) 10 MG suppository Place 10 mg rectally as needed for Constipation (if day 4 with no BM)   Yes Historical Provider, MD        [facility] Meds:    Current Facility-Administered Medications   Medication Dose Route Frequency Provider Last Rate Last Admin    sodium chloride flush 0.9 % injection 5-40 mL  5-40 mL Intravenous BID Pillo Lopez MD   10 mL at 08/09/21 0827    sodium chloride flush 0.9 % injection 5-40 mL  5-40 mL Intravenous 2 times per day Arron Hernandes MD   10 mL at 08/09/21 0834    sodium chloride flush 0.9 % injection 5-40 mL  5-40 mL Intravenous PRN Arron Hernandes MD        sodium chloride flush 0.9 % injection 5-40 mL  5-40 mL Intravenous 2 times per day Jose Aguila MD   10 mL at 08/09/21 0834    sodium chloride flush 0.9 % injection 5-40 mL  5-40 mL Intravenous PRN Jose Aguila MD        0.9 % sodium chloride infusion  25 mL Intravenous PRN Jose Aguila MD        ondansetron (ZOFRAN-ODT) disintegrating tablet 4 mg  4 mg Oral Q8H PRN Jose Aguila MD        Or    ondansetron (ZOFRAN) injection 4 mg  4 mg Intravenous Q6H PRN Jose Aguila MD        polyethylene glycol (GLYCOLAX) packet 17 g  17 g Oral Daily PRN Jose Aguila MD        acetaminophen (TYLENOL) tablet 650 mg  650 mg Oral Q6H PRN Jose Aguila MD        Or   [facility] acetaminophen (TYLENOL) suppository 650 mg  650 mg Rectal Q6H PRN Jose Aguila MD        0.9% NaCl with KCl 20 mEq infusion   Intravenous Continuous Jose Aguila  mL/hr at 08/09/21 0646 New Bag at 08/09/21 0646    cefepime (MAXIPIME) 1000 mg IVPB minibag  1,000 mg Intravenous Q12H Andrew Uriarte  mL/hr at 08/09/21 1706 1,000 mg at 08/09/21 1706    midodrine (PROAMATINE) tablet 10 mg  10 mg Oral TID WC Jose Aguila MD   10 mg at 08/09/21 1706    fosphenytoin (CEREBYX) 200 mg PE in dextrose 5 % 50 mL IVPB (maintenance dose)  200 mg PE Intravenous Q12H Jose Aguila MD   Stopped at 08/09/21 0947    [START ON 8/10/2021] vancomycin (VANCOCIN) 1000 mg in 200 mL IVPB  1,000 mg Intravenous Q24H Jose Aguila MD          sodium chloride      0.9% NaCl with KCl 20 mEq 100 mL/hr at 08/09/21 0646       Social History / Family History:     Social History     Socioeconomic History    Marital status: Single     Spouse name: Not on file    Number of children: Not on file    Years of education: Not on file    Highest education level: Not on file   Occupational History    Not on file   Tobacco Use    Smoking status: Not on file   Substance and Sexual Activity    Alcohol use: Not on file    Drug use: Not on file    Sexual activity: Not on file   Other Topics Concern    Not on file   Social History Narrative    Not on file     Social Determinants of Health     Financial Resource Strain:     Difficulty of Paying Living Expenses:    Food Insecurity:     Worried About Running Out of Food in the Last Year:     [address] in the Last Year:    Transportation Needs:     Lack of Transportation (Medical):  Lack of Transportation (Non-Medical):    Physical Activity:     Days of Exercise per Week:     Minutes of Exercise per Session:    Stress:     Feeling of Stress :    Social Connections:     Frequency of Communication with Friends and Family:     Frequency of Social Gatherings with Friends and Family:     Attends Restoration Services:     Active Member of Clubs or Organizations:     Attends Club or Organization Meetings:     Marital Status:    Intimate Partner Violence:     Fear of Current or Ex-Partner:     Emotionally Abused:     Physically Abused:     Sexually Abused:     No family history on file. otherwise irrelevant to this surgical issue. Review of Systems:    He is demented, and could not give me ROS. .    Physical Exam:  Vital Signs:   Vitals:    08/09/21 1800   BP: 86/66   Pulse: 74   Resp: 11   Temp:    SpO2:      Body mass index is 18.72 kg/m². General appearance: Pt Alert and awake, very pleasant, in no apparent acute distress. HEENT:  RADHA, Conjunctiva clear. EOMI, No JVDs, Bruits, Megalies: neither thyroid nor lymph nodes. Lungs: Clear to auscultation bilaterally. Heart: Regular rate and rhythm, S1, S2 normal, no murmur, rub or gallop. Abdomen: Non tender. Moderately distended. Positive bowel sounds.  No hernias noted, no masses palpable. Extremities: Warm, well perfused, no cyanosis or edema. Skin: Skin color, texture, turgor normal.  Neurologic: Grossly normal, Cranial nerves from II to XII intact, Deep tendon reflexes normal.  Lymph nodes: No palpable LNs, Cervical, groins, abdominal.  Musculoskeletal: Bilateral Upper and lower extremities ROM within normal limits. Radiologic / Imaging / TESTING  CT ABDOMEN PELVIS W IV CONTRAST Additional Contrast? None    Result Date: 8/9/2021  EXAMINATION: CT OF THE ABDOMEN AND PELVIS WITH CONTRAST, 8/9/2021 1:56 am TECHNIQUE: CT of the abdomen and pelvis was performed with the administration of intravenous contrast. Multiplanar reformatted images are provided for review. Dose modulation, iterative reconstruction, and/or weight based adjustment of the mA/kV was utilized to reduce the radiation dose to as low as reasonably achievable. COMPARISON: None HISTORY: ORDERING SYSTEM PROVIDED HISTORY:  Distended abdomen TECHNOLOGIST PROVIDED HISTORY: Reason for Exam:  Distended abdomen Additional Contrast?  None Decision Support Exception - unselect if not a suspected or confirmed emergency medical condition->Emergency Medical Condition (MA) Reason for Exam:  R/O DARRIUS FINDINGS: Lower Chest: Post CABG changes are seen. Bibasilar atelectasis is noted. Organs: The liver, spleen, adrenal glands, pancreas, and kidneys are unremarkable. GI/Bowel: There is circumferential thickening of the rectum, concerning for neoplasm. Evaluation of this area is limited by beam hardening artifact from the hardware within the bilateral hips. This results in large bowel obstruction with distended loops of large bowel with air-fluid levels. The sigmoid colon measures 8.1 cm. The appendix is normal.  Mildly distended small bowel loops are seen. Pelvis: Bladder calculi are noted. Peritoneum/Retroperitoneum: There is no evidence of free air. Extensive atherosclerotic calcifications of the aorta are present.   There is a focal type B dissection involving the distal thoracic aorta. There is severe aortic stenosis with extensive calcified plaque noted at the level of the SMA with atherosclerotic plaque noted at the origin of the SMA. The SMA is patent. Extensive calcified atherosclerotic plaque is seen extending into the bilateral common iliac arteries resulting in severe stenosis proximally. Bones/Soft Tissues: Right hip arthroplasty is noted. There has been internal fixation of the left proximal femur. 1. There is circumferential thickening of the rectum, concerning for a neoplasm. This results in large bowel obstruction. 2. 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 3. Focal calcified atherosclerotic plaque involving the aorta at the level of the SMA resulting in severe stenosis. The SMA is patent. 4. Severe stenosis is seen involving the proximal bilateral common iliac arteries. Findings were discussed with NORA MURGUIA at 3:05 a.m. on 8/9/2021. XR CHEST PORTABLE    Result Date: 8/9/2021  EXAMINATION: ONE XRAY VIEW OF THE CHEST 8/8/2021 6:34 pm COMPARISON: None. HISTORY: ORDERING SYSTEM PROVIDED HISTORY: distended abdomen TECHNOLOGIST PROVIDED HISTORY: Reason for exam:->distended abdomen Reason for Exam: distended abdomen Acuity: Acute Type of Exam: Initial FINDINGS: Marginal inspiration is present. Linear opacity is present within the left lung base, suggesting atelectasis or scarring. Heart size is normal full level of inspiration. No pneumothorax is present. Vascular markings are distinct. Patient is status post prior CABG. Osseous structures are osteopenic. Multiple air filled, distended loops of bowel are present within the upper abdomen. Colonic interposition is noted beneath the right hemidiaphragm. 1. Markedly distended air-filled loops of bowel within the upper abdomen.  Dedicated imaging of the abdomen pelvis recommended as concern is for obstruction. Echo 2D w Doppler w Color w Contrast    Result Date: 8/9/2021  Transthoracic Echocardiography Report (TTE)  Demographics   Patient Name       Katerina Lobo   Date of Study       08/09/2021   Date of Birth      07/20/1932         Gender              Male   Age                80 year(s)         Race                   Patient Number     [id number]         Room Number         2011   Visit Number       [id number]   Corporate ID       [id number]   Accession Number   [id number]         Krishnafliang Gregorio,                                                            [facility]   Ordering Physician Elana Talavera MD        Physician           MD  Procedure Type of Study   TTE procedure:ECHOCARDIOGRAM 2D W DOPPLER W COLOR W CONTRAST. Procedure Date Date: 08/09/2021 Start: 07:42 AM Study Location: Portable Technical Quality: Poor visualization due to poor acoustical window. Indications:Coronary artery disease. Patient Status: Routine Height: 69 inches Weight: 180 pounds BSA: 1.98 m2 BMI: 26.58 kg/m2 HR: 133 bpm BP: 139/105 mmHg  Conclusions   Summary  Left ventricular systolic function is normal.  Ejection fraction is visually estimated at 50-55%. Individual aortic valve leaflets are not clearly visualized. The aortic valve is heavily calcified; degree of stenosis cannot be  determined due to poor acoustical windows. Tricuspid valve is not well visualized, vegetation cannot be ruled out. No evidence of any pericardial effusion. Signature   ------------------------------------------------------------------  Electronically signed by Jarek Jeffery MD (Interpreting  physician) on 08/09/2021 at 11:10 AM  ------------------------------------------------------------------   Findings   Left Ventricle  Left ventricular systolic function is normal.  Ejection fraction is visually estimated at 50-55%.   Mild left ventricular hypertrophy. Cannot determine diastolic function due to arrhythmia. Left Atrium  Essentially normal left atrium. Right Atrium  Essentially normal right atrium. Right Ventricle  Essentially normal right ventricle. Aortic Valve  Individual aortic valve leaflets are not clearly visualized. The aortic valve is heavily calcified; degree of stenosis cannot be  determined due to poor acoustical windows. Mitral Valve  Structurally normal mitral valve. Tricuspid Valve  Tricuspid valve is not well visualized - no significant TR noted   Pulmonic Valve  The pulmonic valve was not well visualized. Pericardial Effusion  No evidence of any pericardial effusion. Pleural Effusion  No evidence of pleural effusion. Miscellaneous  The aorta is not well visualized.   M-Mode/2D Measurements & Calculations   LV Diastolic Dimension:  LV Systolic Dimension:  LA Dimension: 3.4 cmAO Root  3.66 cm                  2.65 cm                 Dimension: 3.3 cmLA Area:  LV FS:27.6 %             LV Volume Diastolic: 49 55.5 cm2  LV PW Diastolic: 2.70 cm ml  LV PW Systolic: 0.82 cm  LV Volume Systolic: 23  Septum Diastolic: 9.05   ml  cm                       LV EDV/LV EDV Index: 49 RV Diastolic Dimension:  Septum Systolic: 2.13 cm LB/42 L5RK ESV/LV ESV   3.03 cm  CO: 6.35 l/min           Index: 23 ml/12 m2  CI: 3.21 l/m*m2          EF Calculated (A4C):    LA/Aorta: 1.03                           53.1 %  LV Area Diastolic: 05.8  EF Calculated (2D):     LA volume/Index: 31 ml  cm2                      54.4 %                  /61T1  LV Area Systolic: 48.6  cm2                      LV Length: 7 cm                            LVOT: 2 cm  Doppler Measurements & Calculations   MV Peak E-Wave: 56.3 AV Peak Velocity: 202 cm/s    LVOT Peak Velocity: 84.2  cm/s                 AV Peak Gradient: 16.32 mmHg  cm/s  MV Peak A-Wave: 79.5 AV Mean Velocity: 160 cm/s    LVOT Mean Velocity: 56.1  cm/s                 AV Mean Gradient: 11 mmHg     cm/s  MV E/A Ratio: 0.71   AV VTI: 33 cm                 LVOT Peak Gradient: 3  MV Peak Gradient:    AV Area (Continuity):1.45 cm2 mmHgLVOT Mean Gradient: 1  1.27 mmHg                                          mmHg                       LVOT VTI: 15.2 cm             Estimated RVSP: 32 mmHg  MV P1/2t: 23 msec                                  Estimated RAP:3 mmHg  MVA by PHT:9.57 cm2  Estimated PASP: 29.83 mmHg   MV E' Septal                                       TR Velocity:259 cm/s  Velocity: 6.58 cm/s                                TR Gradient:26.83 mmHg  MV E' Lateral  Velocity: 8.99 cm/s  MV E/E' septal: 8.56  MV E/E' lateral:  6.26         Labs:    Recent Results (from the past 24 hour(s))   EKG 12 Lead    Collection Time: 08/09/21 12:53 AM   Result Value Ref Range    Ventricular Rate 99 BPM    Atrial Rate 99 BPM    P-R Interval 152 ms    QRS Duration 90 ms    Q-T Interval 368 ms    QTc Calculation (Bazett) 472 ms    P Axis 59 degrees    R Axis -24 degrees    T Axis 39 degrees    Diagnosis       Normal sinus rhythm  Nonspecific ST abnormality  Abnormal ECG  No previous ECGs available     CBC with Auto Diff    Collection Time: 08/09/21  1:14 AM   Result Value Ref Range    WBC 12.6 (H) 4.0 - 10.5 K/CU MM    RBC 5.15 4.6 - 6.2 M/CU MM    Hemoglobin 15.8 13.5 - 18.0 GM/DL    Hematocrit 48.3 42 - 52 %    MCV 93.8 78 - 100 FL    MCH 30.7 27 - 31 PG    MCHC 32.7 32.0 - 36.0 %    RDW 14.8 11.7 - 14.9 %    Platelets 402 965 - 953 K/CU MM    MPV 10.0 7.5 - 11.1 FL    Differential Type AUTOMATED DIFFERENTIAL     Segs Relative 85.4 (H) 36 - 66 %    Lymphocytes % 9.7 (L) 24 - 44 %    Monocytes % 4.0 0 - 4 %    Eosinophils % 0.2 0 - 3 %    Basophils % 0.2 0 - 1 %    Segs Absolute 10.8 K/CU MM    Lymphocytes Absolute 1.2 K/CU MM    Monocytes Absolute 0.5 K/CU MM    Eosinophils Absolute 0.0 K/CU MM    Basophils Absolute 0.0 K/CU MM    Nucleated RBC % 0.0 %    Total Nucleated RBC 0.0 K/CU MM    Total Immature Neutrophil 0.06 K/CU MM    Immature Neutrophil % 0.5 (H) 0 - 0.43 %   CMP    Collection Time: 08/09/21  1:14 AM   Result Value Ref Range    Sodium 142 135 - 145 MMOL/L    Potassium 3.3 (L) 3.5 - 5.1 MMOL/L    Chloride 101 99 - 110 mMol/L    CO2 25 21 - 32 MMOL/L    BUN 12 6 - 23 MG/DL    CREATININE 0.8 (L) 0.9 - 1.3 MG/DL    Glucose 117 (H) 70 - 99 MG/DL    Calcium 8.9 8.3 - 10.6 MG/DL    Albumin 3.6 3.4 - 5.0 GM/DL    Total Protein 6.9 6.4 - 8.2 GM/DL    Total Bilirubin 0.2 0.0 - 1.0 MG/DL    ALT 9 (L) 10 - 40 U/L    AST 15 15 - 37 IU/L    Alkaline Phosphatase 196 (H) 40 - 128 IU/L    GFR Non-African American >60 >60 mL/min/1.73m2    GFR African American >60 >60 mL/min/1.73m2    Anion Gap 16 4 - 16   Lipase    Collection Time: 08/09/21  1:14 AM   Result Value Ref Range    Lipase 23 13 - 60 IU/L   Lactic acid, plasma    Collection Time: 08/09/21  1:14 AM   Result Value Ref Range    Lactate 2.7 (HH) 0.4 - 2.0 mMOL/L   Troponin    Collection Time: 08/09/21  1:14 AM   Result Value Ref Range    Troponin T 0.012 (H) <0.01 NG/ML   Procalcitonin    Collection Time: 08/09/21  1:14 AM   Result Value Ref Range    Procalcitonin 0.042    Troponin    Collection Time: 08/09/21  6:40 AM   Result Value Ref Range    Troponin T 0.013 (H) <0.01 NG/ML   Urinalysis with microscopic    Collection Time: 08/09/21 10:45 AM   Result Value Ref Range    Color, UA YELLOW YELLOW    Clarity, UA CLEAR CLEAR    Glucose, Urine NEGATIVE NEGATIVE MG/DL    Bilirubin Urine NEGATIVE NEGATIVE MG/DL    Ketones, Urine MODERATE (A) NEGATIVE MG/DL    Specific Gravity, UA 1.029 1.001 - 1.035    Blood, Urine SMALL (A) NEGATIVE    pH, Urine 5.0 5.0 - 8.0    Protein, UA NEGATIVE NEGATIVE MG/DL    Urobilinogen, Urine NEGATIVE 0.2 - 1.0 MG/DL    Nitrite Urine, Quantitative NEGATIVE NEGATIVE    Leukocyte Esterase, Urine NEGATIVE NEGATIVE    RBC, UA 5 (H) 0 - 3 /HPF    WBC, UA 2 0 - 2 /HPF    Bacteria, UA NEGATIVE NEGATIVE /HPF    Squam Epithel, UA <1 /HPF    Mucus, UA RARE (A) NEGATIVE HPF    Trichomonas, UA NONE SEEN NONE SEEN /HPF    Unclassified Cast 3 /LPF    Hyaline Casts, UA 0 /LPF   Lactic Acid, Plasma    Collection Time: 08/09/21  6:10 PM   Result Value Ref Range    Lactate 1.7 0.4 - 2.0 mMOL/L       Diagnosis:  Patient Active Problem List   Diagnosis    Large bowel obstruction [facility])       Assessment & plan:  Bob Wheatley is a very pleasant 80 y.o. male presenting with constipation, a CT revealing a large bowel obstruction, and questioning a rectal obstructing mass. . D/W Dr Nini Casanova and he will colonoscope him tomorrow and let me know, I discussed with his daughter, and she wants NO surgical interventions. .. Continue NPO/Bowel rest, IV Fluids, Pain control, Nausea control. Will follow the colonoscopy and make recommendations. Thank you doctor Arron Hernandes MD very much for your consultation and for the opportunity to take care of Mr Bob Wheatley with you, I'll follow along with you and I'll update you on any new events in his care.    ____________________________________________    Raúl Bain MD, FACS, FICS    8/9/2021  7:20 PM      Patient was seen with total face to face time of 45 minutes. More than 50% of this visit was counseling and education as above in my assessment and plan section of my note.

## 2021-08-09 NOTE — ED PROVIDER NOTES
Triage Chief Complaint:   Emesis and Diarrhea      [place]:  Bob Wheatley is a 80 y.o. male that presents to the emergency department from a nursing home for nausea and diarrhea. EMS states blood pressure was mildly low. States he is confused at his baseline per EMS. No fevers or chills. . Patient is a poor historian. Apparently nursing facility was concerned for possible bowel obstruction. EMS stated that they did not provide paperwork but stated patient is a DNR CCA. Past Medical History:   Diagnosis Date    Alzheimer disease ([facility] [address]     AMS (altered mental status)     Arthritis     CAD (coronary artery disease)     CHF (congestive heart failure) (HCC)     Depression     Difficulty walking     Encephalopathy     Gastroenteritis     Hyperlipidemia     Hypertension     Hypoxemia     Ileus (HCC)     Malnutrition (HCC)     Muscle weakness     Osteoporosis     Seizures (HCC)     TIA (transient ischemic attack)      No past surgical history on file. No family history on file. Social History     Socioeconomic History    Marital status: Single     Spouse name: Not on file    Number of children: Not on file    Years of education: Not on file    Highest education level: Not on file   Occupational History    Not on file   Tobacco Use    Smoking status: Not on file   Substance and Sexual Activity    Alcohol use: Not on file    Drug use: Not on file    Sexual activity: Not on file   Other Topics Concern    Not on file   Social History Narrative    Not on file     Social Determinants of Health     Financial Resource Strain:     Difficulty of Paying Living Expenses:    Food Insecurity:     Worried About Running Out of Food in the Last Year:     [address] in the Last Year:    Transportation Needs:     Lack of Transportation (Medical):      Lack of Transportation (Non-Medical):    Physical Activity:     Days of Exercise per Week:     Minutes of Exercise per Session:    Stress:  Feeling of Stress :    Social Connections:     Frequency of Communication with Friends and Family:     Frequency of Social Gatherings with Friends and Family:     Attends Hinduism Services:     Active Member of Clubs or Organizations:     Attends Club or Organization Meetings:     Marital Status:    Intimate Partner Violence:     Fear of Current or Ex-Partner:     Emotionally Abused:     Physically Abused:     Sexually Abused:      Current Facility-Administered Medications   Medication Dose Route Frequency Provider Last Rate Last Admin    sodium chloride flush 0.9 % injection 5-40 mL  5-40 mL Intravenous BID Ken Ocampo MD        0.9 % sodium chloride bolus  500 mL Intravenous Once Ken Ocampo MD        sodium chloride flush 0.9 % injection 5-40 mL  5-40 mL Intravenous 2 times per day Ken Ocampo MD        sodium chloride flush 0.9 % injection 5-40 mL  5-40 mL Intravenous PRN Ken Ocampo MD        0.9 % sodium chloride infusion  25 mL Intravenous PRN Ken Ocampo MD        cefepime (MAXIPIME) 2000 mg IVPB minibag  2,000 mg Intravenous Once Ken Ocampo MD        vancomycin (VANCOCIN) 25 mg/kg in dextrose 5 % 250 mL IVPB  25 mg/kg Intravenous Once Ken Ocampo MD         Current Outpatient Medications   Medication Sig Dispense Refill    Cholecalciferol (VITAMIN D3) 125 MCG (5000 UT) TABS Take 1 tablet by mouth daily      ondansetron (ZOFRAN) 4 MG tablet Take 4 mg by mouth every 8 hours as needed for Nausea or Vomiting      midodrine (PROAMATINE) 5 MG tablet Take 10 mg by mouth 3 times daily      docusate sodium (COLACE) 100 MG capsule Take 200 mg by mouth nightly      sertraline (ZOLOFT) 25 MG tablet Take 25 mg by mouth daily      polyethylene glycol (MIRALAX) 17 g PACK packet Take 17 g by mouth 2 times daily      senna-docusate (PERICOLACE) 8.6-50 MG per tablet Take 1 tablet by mouth every 12 hours as needed for Constipation      atorvastatin (LIPITOR) 40 MG tablet Take 40 mg by mouth nightly      apixaban (ELIQUIS) 5 MG TABS tablet Take by mouth 2 times daily      mirtazapine (REMERON) 15 MG tablet Take 15 mg by mouth nightly      phenytoin (DILANTIN) 100 MG ER capsule Take by mouth 2 times daily      potassium chloride (KLOR-CON M) 20 MEQ TBCR extended release tablet Take 40 mEq by mouth daily (with breakfast)      nitroGLYCERIN (NITROSTAT) 0.4 MG SL tablet Place 0.4 mg under the tongue every 5 minutes as needed for Chest pain up to max of 3 total doses. If no relief after 1 dose, call 911.  magnesium hydroxide (MILK OF MAGNESIA) 400 MG/5ML suspension Take 30 mLs by mouth daily as needed for Constipation      acetaminophen (TYLENOL) 500 MG tablet Take 500 mg by mouth every 6 hours as needed for Pain      bisacodyl (DULCOLAX) 10 MG suppository Place 10 mg rectally as needed for Constipation (if day 4 with no BM)       Allergies   Allergen Reactions    Nabumetone Other (See Comments)     unknown    Oxycodone Other (See Comments)     unknown     Nursing Notes Reviewed    ROS:  Patient poor historian. Confused at baseline. .    Physical Exam:  ED Triage Vitals   Enc Vitals Group      BP       Pulse       Resp       Temp       Temp src       SpO2       Weight       Height       Head Circumference       Peak Flow       Pain Score       Pain Loc       Pain Edu? Excl. in [address]? My pulse oximetry interpretation is which is within the normal range    GENERAL APPEARANCE: Awake and alert. Cooperative. Confused at baseline. HEAD: Normocephalic. Atraumatic. EYES: EOM's grossly intact. Sclera anicteric. ENT: Mucous membranes are moist. Tolerates saliva. No trismus. NECK: Supple. No meningismus. Trachea midline. HEART: RRR. Radial pulses 2+. LUNGS: Respirations unlabored. CTAB. No wheezing or stridor. ABDOMEN: Soft. Slightly distended abdomen. No guarding or rebound. EXTREMITIES: No acute deformities. SKIN: Warm and dry.   NEUROLOGICAL: No gross facial drooping. Moves all 4 extremities spontaneously.   PSYCHIATRIC: Confused    I have reviewed and interpreted all of the currently available lab results from this visit (if applicable):  Results for orders placed or performed during the hospital encounter of 08/08/21   CBC with Auto Diff   Result Value Ref Range    WBC 12.6 (H) 4.0 - 10.5 K/CU MM    RBC 5.15 4.6 - 6.2 M/CU MM    Hemoglobin 15.8 13.5 - 18.0 GM/DL    Hematocrit 48.3 42 - 52 %    MCV 93.8 78 - 100 FL    MCH 30.7 27 - 31 PG    MCHC 32.7 32.0 - 36.0 %    RDW 14.8 11.7 - 14.9 %    Platelets 741 571 - 890 K/CU MM    MPV 10.0 7.5 - 11.1 FL    Differential Type AUTOMATED DIFFERENTIAL     Segs Relative 85.4 (H) 36 - 66 %    Lymphocytes % 9.7 (L) 24 - 44 %    Monocytes % 4.0 0 - 4 %    Eosinophils % 0.2 0 - 3 %    Basophils % 0.2 0 - 1 %    Segs Absolute 10.8 K/CU MM    Lymphocytes Absolute 1.2 K/CU MM    Monocytes Absolute 0.5 K/CU MM    Eosinophils Absolute 0.0 K/CU MM    Basophils Absolute 0.0 K/CU MM    Nucleated RBC % 0.0 %    Total Nucleated RBC 0.0 K/CU MM    Total Immature Neutrophil 0.06 K/CU MM    Immature Neutrophil % 0.5 (H) 0 - 0.43 %   CMP   Result Value Ref Range    Sodium 142 135 - 145 MMOL/L    Potassium 3.3 (L) 3.5 - 5.1 MMOL/L    Chloride 101 99 - 110 mMol/L    CO2 25 21 - 32 MMOL/L    BUN 12 6 - 23 MG/DL    CREATININE 0.8 (L) 0.9 - 1.3 MG/DL    Glucose 117 (H) 70 - 99 MG/DL    Calcium 8.9 8.3 - 10.6 MG/DL    Albumin 3.6 3.4 - 5.0 GM/DL    Total Protein 6.9 6.4 - 8.2 GM/DL    Total Bilirubin 0.2 0.0 - 1.0 MG/DL    ALT 9 (L) 10 - 40 U/L    AST 15 15 - 37 IU/L    Alkaline Phosphatase 196 (H) 40 - 128 IU/L    GFR Non-African American >60 >60 mL/min/1.73m2    GFR African American >60 >60 mL/min/1.73m2    Anion Gap 16 4 - 16   Lipase   Result Value Ref Range    Lipase 23 13 - 60 IU/L   Lactic acid, plasma   Result Value Ref Range    Lactate 2.7 (HH) 0.4 - 2.0 mMOL/L   Troponin   Result Value Ref Range    Troponin T 0.012 (H) <0.01 NG/ML   EKG 12 Lead   Result Value Ref Range    Ventricular Rate 99 BPM    Atrial Rate 99 BPM    P-R Interval 152 ms    QRS Duration 90 ms    Q-T Interval 368 ms    QTc Calculation (Bazett) 472 ms    P Axis 59 degrees    R Axis -24 degrees    T Axis 39 degrees    Diagnosis       Normal sinus rhythm  Nonspecific ST abnormality  Abnormal ECG  No previous ECGs available          Radiographs:  [] Radiologist's Wet Read Report Reviewed:      XR CHEST PORTABLE (Final result)  Result time 08/09/21 00:41:41  Final result by Jose Alejandro Whipple DO (08/09/21 00:41:41)                Impression:    1. Markedly distended air-filled loops of bowel within the upper abdomen. Dedicated imaging of the abdomen pelvis recommended as concern is for   obstruction. Narrative:    EXAMINATION:   ONE XRAY VIEW OF THE CHEST     8/8/2021 6:34 pm     COMPARISON:   None. HISTORY:   ORDERING SYSTEM PROVIDED HISTORY: distended abdomen   TECHNOLOGIST PROVIDED HISTORY:   Reason for exam:->distended abdomen   Reason for Exam: distended abdomen   Acuity: Acute   Type of Exam: Initial     FINDINGS:   Marginal inspiration is present.  Linear opacity is present within the left   lung base, suggesting atelectasis or scarring.  Heart size is normal full   level of inspiration.  No pneumothorax is present.  Vascular markings are   distinct.  Patient is status post prior CABG.  Osseous structures are   osteopenic.  Multiple air filled, distended loops of bowel are present within   the upper abdomen.  Colonic interposition is noted beneath the right   hemidiaphragm. [] Discussed with Radiologist:     [] The following radiograph was interpreted by myself in the absence of a radiologist:     EKG: (All EKG's are interpreted by myself in the absence of a cardiologist)  The Ekg interpreted by me shows  normal sinus rhythm with a rate of 99  Axis is   Normal  QTc is  normal  Intervals and Durations are unremarkable.       ST Segments: flattening in  III and aVl  No old EKG           MDM:  Patient hypotensive at 92/69. Temp is 99.8. Heart rate in the 90s. Sats 92% on room air. Started on IV fluids and zofran. EKG shows normal sinus rhythm at 99. LVH. No old EKG for comparison. . CBC shows a white count of 12.6. Hemoglobin of 15.8. Electrolytes shows a decreased potassium of 3.3. Normal kidney function. Glucose is 117. Normal CO2 25. Normal anion gap of 16. Lipase normal at 23. Lactic acid elevated at 2.7. Troponin detectable at 0.012. . CXR shows distended air-filled loops of bowel and upper abdomen. CT abdomen shows circumferential thickening of the rectum concerning for a neoplasm causing a large bowel obstruction. Focal thrombosed chronic type B dissection involving the distal descending thoracic aorta. Focal calcified atherosclerotic plaque involving the aorta at the level of the SMA that is patent. A stenosis in the proximal bilateral common iliac arteries. Will consult general surgery to be involved with case and will admit to the hospitalist.. Spoke with Dr. Missy Olivares who states he will consult on patient. Asked if I can ask hospitalist to consult GI in the morning as well do not see that he has any had any recent colonoscopies. Clinical Impression:  1. Large bowel obstruction ([address]    2. Chronic dissection of thoracic aorta (HCC)    3. Elevated troponin        Disposition Vitals:  [unfilled], [unfilled], [unfilled], [unfilled]    Disposition referral (if applicable):  No follow-up provider specified.     Disposition medications (if applicable):  New Prescriptions    No medications on file         (Please note that portions of this note may have been completed with a voice recognition program. Efforts were made to edit the dictations but occasionally words are mis-transcribed.)    MD Pierre Mccormick MD  08/09/21 0592

## 2021-08-09 NOTE — ED NOTES
Bed: H-05  Expected date:   Expected time:   Means of arrival:   Comments:  EMS     Nola Dougherty RN  08/08/21 0157

## 2021-08-09 NOTE — DISCHARGE INSTR - COC
Continuity of Care Form    Patient Name: Rayo Burton   :  1932  MRN:  [id number]    Admit date:  2021  Discharge date:  2021        Code Status Order: No Order   Advance Directives:      Admitting Physician:  No admitting provider for patient encounter. PCP: No primary care provider on file. Discharging Nurse: [facility] Unit/Room#: H05/H-05  Discharging Unit Phone Number: [phone]    Emergency Contact:   No emergency contact information on file. Past Surgical History:  No past surgical history on file. Immunization History: There is no immunization history on file for this patient. Active Problems: There is no problem list on file for this patient. Isolation/Infection:   Isolation            No Isolation          Patient Infection Status       None to display            Nurse Assessment:  Last Vital Signs: There were no vitals taken for this visit. Last documented pain score (0-10 scale):    Last Weight:   Wt Readings from Last 1 Encounters:   No data found for Wt     Mental Status:  disoriented    IV Access:  - None    Nursing Mobility/ADLs:  Walking   Dependent  Transfer  Dependent  Bathing  Dependent  Dressing  Dependent  Toileting  Dependent  Feeding  Assisted  Med Admin  Assisted  Med Delivery   whole    Wound Care Documentation and Therapy:        Elimination:  Continence: Bowel: No  Bladder: No  Urinary Catheter: None   Colostomy/Ileostomy/Ileal Conduit: No       Date of Last BM: 2021    Safety Concerns:      At Risk for Falls    Impairments/Disabilities:      Hearing    Patient's personal belongings (please select all that are sent with patient):  None    RN SIGNATURE:  Electronically signed by Mer Shankar RN on 21 at 5:26 PM EDT    CASE MANAGEMENT/SOCIAL WORK SECTION    Inpatient Status Date: ***    Readmission Risk Assessment Score:  Readmission Risk              Risk of Unplanned Readmission:  0           Discharging to Facility/ Agency   Name:   Address:  Phone:  Fax:    / signature: {Esignature:[id number]}    PHYSICIAN SECTION    Prognosis: Guarded    Condition at Discharge: Stable    Rehab Potential (if transferring to Rehab): Fair     Nutrition Therapy:  Current Nutrition Therapy:   - Oral Diet:  General    Encourage PO fluids    Routes of Feeding: Oral  Liquids: No Restrictions  Daily Fluid Restriction: no  Last Modified Barium Swallow with Video (Video Swallowing Test): not done    Treatments at the Time of Hospital Discharge:   Respiratory Treatments: none  Oxygen Therapy:  is not on home oxygen therapy. Ventilator:    - No ventilator support    Rehab Therapies: none  Weight Bearing Status/Restrictions: No weight bearing restirctions  Other Medical Equipment (for information only, NOT a DME order):  none    Recommended Labs or Other Treatments After Discharge: CBC and BMP on Monday August 16, and repeat in one week after that  -Dilantin level in one week    Physician Certification: I certify the above information and transfer of Angel Kelly  is necessary for the continuing treatment of the diagnosis listed and that he requires Intermediate Nursing Care for greater 30 days.      Update Admission H&P: No change in H&P    PHYSICIAN SIGNATURE:  Electronically signed by Sukhjinder Camarena MD on 8/13/21 at 1:33 PM EDT

## 2021-08-09 NOTE — CONSULTS
INPATIENT CARDIOLOGY CONSULT NOTE       Reason for consultation:  Elevated troponins    Referring physician:  Kate Carver MD     Primary care physician: No primary care provider on file. Dear Kate Carver MD Thank you for the consult    Chief Complaint   Patient presents with    Emesis    Diarrhea       History of present illness:Trace is a 80 y. o.year old who  presents with  Chief Complaint   Patient presents with    Emesis    Diarrhea     Patient is a 71-year-old gentleman who has underlying dementia and does not know the reason why he is admitted to the hospital.  Patient has prior history of CAD s/p CABG in 2001, history of diastolic heart failure, paroxysmal atrial fibrillation, history of stroke, left atrial appendage thrombus in 2016, moderate aortic stenosis, advanced dementia, history of seizure disorder who follows up at [facility] for cardiac needs    Patient is admitted with nausea vomiting and diarrhea  He denies any known history of CAD however it is well documented. Denies any current chest pain shortness of breath or palpitations    Cardiology is consulted for elevated troponin, which are trivial.    EKG shows sinus rhythm without any ischemic changes nonspecific ST-T changes    Past medical history:    has a past medical history of Alzheimer disease ([address] AMS (altered mental status), Arthritis, CAD (coronary artery disease), CHF (congestive heart failure) ([address] Depression, Difficulty walking, Encephalopathy, Gastroenteritis, Hyperlipidemia, Hypertension, Hypoxemia, Ileus ([address] Malnutrition ([address] Muscle weakness, Osteoporosis, Seizures ([address] and TIA (transient ischemic attack). Past surgical history:   has no past surgical history on file.   Social History:     Family history:   no family history of CAD, STROKE of DM    Allergies   Allergen Reactions    Nabumetone Other (See Comments)     unknown    Oxycodone Other (See Comments)     unknown       sodium chloride flush 0.9 % injection 5-40 mL, BID  sodium chloride flush 0.9 % injection 5-40 mL, 2 times per day  sodium chloride flush 0.9 % injection 5-40 mL, PRN  sodium chloride flush 0.9 % injection 5-40 mL, 2 times per day  sodium chloride flush 0.9 % injection 5-40 mL, PRN  0.9 % sodium chloride infusion, PRN  ondansetron (ZOFRAN-ODT) disintegrating tablet 4 mg, Q8H PRN   Or  ondansetron (ZOFRAN) injection 4 mg, Q6H PRN  polyethylene glycol (GLYCOLAX) packet 17 g, Daily PRN  acetaminophen (TYLENOL) tablet 650 mg, Q6H PRN   Or  acetaminophen (TYLENOL) suppository 650 mg, Q6H PRN  0.9% NaCl with KCl 20 mEq infusion, Continuous  cefepime (MAXIPIME) 1000 mg IVPB minibag, Q12H  midodrine (PROAMATINE) tablet 10 mg, TID WC  fosphenytoin (CEREBYX) 200 mg PE in dextrose 5 % 50 mL IVPB (maintenance dose), Q12H  [START ON 8/10/2021] vancomycin (VANCOCIN) 1000 mg in 200 mL IVPB, Q24H      Current Facility-Administered Medications   Medication Dose Route Frequency Provider Last Rate Last Admin    sodium chloride flush 0.9 % injection 5-40 mL  5-40 mL Intravenous BID Clayton Gray MD   10 mL at 08/09/21 0827    sodium chloride flush 0.9 % injection 5-40 mL  5-40 mL Intravenous 2 times per day Clayton Gray MD   10 mL at 08/09/21 0834    sodium chloride flush 0.9 % injection 5-40 mL  5-40 mL Intravenous PRN Clayton Gray MD        sodium chloride flush 0.9 % injection 5-40 mL  5-40 mL Intravenous 2 times per day Fabiana Sellers MD   10 mL at 08/09/21 0834    sodium chloride flush 0.9 % injection 5-40 mL  5-40 mL Intravenous PRN Fabiana Sellers MD        0.9 % sodium chloride infusion  25 mL Intravenous PRN Fabiana Sellers MD        ondansetron (ZOFRAN-ODT) disintegrating tablet 4 mg  4 mg Oral Q8H PRN Fabiana Sellers MD        Or    ondansetron (ZOFRAN) injection 4 mg  4 mg Intravenous Q6H PRN Fabiana Sellers MD        polyethylene glycol (GLYCOLAX) packet 17 g  17 g Oral Daily PRN Rashmi Miles MD        acetaminophen (TYLENOL) tablet 650 mg  650 mg Oral Q6H PRN Rashmi Miles MD        Or    acetaminophen (TYLENOL) suppository 650 mg  650 mg Rectal Q6H PRN Rashmi Miles MD        0.9% NaCl with KCl 20 mEq infusion   Intravenous Continuous Rashmi Miles  mL/hr at 08/09/21 0646 New Bag at 08/09/21 0646    cefepime (MAXIPIME) 1000 mg IVPB minibag  1,000 mg Intravenous Q12H Rashmi Miles MD        midodrine (PROAMATINE) tablet 10 mg  10 mg Oral TID WC Rashmi Miles MD   10 mg at 08/09/21 0826    fosphenytoin (CEREBYX) 200 mg PE in dextrose 5 % 50 mL IVPB (maintenance dose)  200 mg PE Intravenous Q12H Rashmi Miles MD   Stopped at 08/09/21 0947    [START ON 8/10/2021] vancomycin (VANCOCIN) 1000 mg in 200 mL IVPB  1,000 mg Intravenous Q24H Rashim Miles MD             Review of Systems:      Dementia  No ROS could be obtained       Physical Examination:      Vitals:    08/09/21 0827   BP:    Pulse: 89   Resp: 27   Temp: 98.1 °F (36.7 °C)   SpO2:       Wt Readings from Last 3 Encounters:   08/09/21 126 lb 12.2 oz (57.5 kg)     Body mass index is 18.72 kg/m². General Appearance:  No distress, conversant - dementia   Constitutional:  Well developed, Well nourished  HEENT:  Normocephalic, Atraumatic, Oropharynx moist, No oral exudates,   Nose normal. Neck Supple Carotid: no carotid bruit  Eyes:  Conjunctiva normal, No discharge. Respiratory:     Normal breath sounds, No respiratory distress, No wheezing, no use of accessory muscles, diaphragm movement is normal  No chest Tenderness  Cardiovascular: D8-P6 + soft systolic murmurs auscultated. No rubs, thrills or gallops. Normal  rhythm. Pedal pulses are normal. No pedal edema  GI:  Soft Non tender, non distended. :  No CVA tenderness. Musculoskeletal:   No tenderness, No cyanosis, No clubbing. Integument:  Warm, Dry, No erythema, No rash.    Lymphatic:  No lymphadenopathy noted.   Neurologic:  Alert    Psychiatric:  Affect normal       Lab Review     Recent Labs     08/09/21 0114   WBC 12.6*   HGB 15.8   HCT 48.3         Recent Labs     08/09/21 0114      K 3.3*      CO2 25   BUN 12   CREATININE 0.8*     Recent Labs     08/09/21 0114   AST 15   ALT 9*   BILITOT 0.2   ALKPHOS 196*     No results for input(s): TROPONINI in the last 72 hours. No results found for: BNP  No results found for: INR, PROTIME      All labs, images, EKGs were personally reviewed      Assessment: 80 y. o.year old with PMH of  has a past medical history of Alzheimer disease ([address] AMS (altered mental status), Arthritis, CAD (coronary artery disease), CHF (congestive heart failure) ([address] Depression, Difficulty walking, Encephalopathy, Gastroenteritis, Hyperlipidemia, Hypertension, Hypoxemia, Ileus ([address] Malnutrition ([address] Muscle weakness, Osteoporosis, Seizures ([address] and TIA (transient ischemic attack). Recommendations:      1. Elevated troponin in the setting of history of coronary disease s/p CABG. Cardiac troponins are in non-ACS trend, likely chronically elevated. In absence of chest pain and EKG changes no further work-up planned at this point. Recommend to follow-up with primary cardiologist as outpatient  2. Small bowel obstruction, possible neoplasm of the rectum on imaging. Surgery/GI follow-up  3. Dehydration/leukocytosis/lactic acidosis cultures drawn on empiric antibiotics, IV fluids  4. History of moderate aortic stenosis: Stable.   Outpatient follow-up with cardiology    No further work-up planned at this time please call us with questions  Thank you for the consult    Dr. Scarlet Mcdonough  8/9/2021 10:58 AM

## 2021-08-10 LAB
ALBUMIN SERPL-MCNC: 3.1 GM/DL (ref 3.4–5)
ALP BLD-CCNC: 140 IU/L (ref 40–129)
ALT SERPL-CCNC: 6 U/L (ref 10–40)
AMYLASE: 43 U/L (ref 25–115)
ANION GAP SERPL CALCULATED.3IONS-SCNC: 9 MMOL/L (ref 4–16)
APTT: 27.2 SECONDS (ref 25.1–37.1)
AST SERPL-CCNC: 11 IU/L (ref 15–37)
BASOPHILS ABSOLUTE: 0 K/CU MM
BASOPHILS RELATIVE PERCENT: 0.3 % (ref 0–1)
BILIRUB SERPL-MCNC: 0.1 MG/DL (ref 0–1)
BUN BLDV-MCNC: 9 MG/DL (ref 6–23)
CALCIUM SERPL-MCNC: 7.5 MG/DL (ref 8.3–10.6)
CHLORIDE BLD-SCNC: 108 MMOL/L (ref 99–110)
CO2: 24 MMOL/L (ref 21–32)
CREAT SERPL-MCNC: 0.6 MG/DL (ref 0.9–1.3)
CULTURE: NORMAL
DIFFERENTIAL TYPE: ABNORMAL
EKG ATRIAL RATE: 99 BPM
EKG DIAGNOSIS: NORMAL
EKG P AXIS: 59 DEGREES
EKG P-R INTERVAL: 152 MS
EKG Q-T INTERVAL: 368 MS
EKG QRS DURATION: 90 MS
EKG QTC CALCULATION (BAZETT): 472 MS
EKG R AXIS: -24 DEGREES
EKG T AXIS: 39 DEGREES
EKG VENTRICULAR RATE: 99 BPM
EOSINOPHILS ABSOLUTE: 0.2 K/CU MM
EOSINOPHILS RELATIVE PERCENT: 1.7 % (ref 0–3)
GFR AFRICAN AMERICAN: >60 ML/MIN/1.73M2
GFR NON-AFRICAN AMERICAN: >60 ML/MIN/1.73M2
GLUCOSE BLD-MCNC: 83 MG/DL (ref 70–99)
HCT VFR BLD CALC: 41.3 % (ref 42–52)
HEMOGLOBIN: 13.3 GM/DL (ref 13.5–18)
IMMATURE NEUTROPHIL %: 0.6 % (ref 0–0.43)
INR BLD: 1.51 INDEX
LIPASE: 28 IU/L (ref 13–60)
LYMPHOCYTES ABSOLUTE: 1.5 K/CU MM
LYMPHOCYTES RELATIVE PERCENT: 14.7 % (ref 24–44)
Lab: NORMAL
MCH RBC QN AUTO: 30.3 PG (ref 27–31)
MCHC RBC AUTO-ENTMCNC: 32.2 % (ref 32–36)
MCV RBC AUTO: 94.1 FL (ref 78–100)
MONOCYTES ABSOLUTE: 0.6 K/CU MM
MONOCYTES RELATIVE PERCENT: 5.3 % (ref 0–4)
NUCLEATED RBC %: 0 %
PDW BLD-RTO: 14.8 % (ref 11.7–14.9)
PLATELET # BLD: 204 K/CU MM (ref 140–440)
PMV BLD AUTO: 9.7 FL (ref 7.5–11.1)
POTASSIUM SERPL-SCNC: 3 MMOL/L (ref 3.5–5.1)
PROTHROMBIN TIME: 19.5 SECONDS (ref 11.7–14.5)
RBC # BLD: 4.39 M/CU MM (ref 4.6–6.2)
SEGMENTED NEUTROPHILS ABSOLUTE COUNT: 8.1 K/CU MM
SEGMENTED NEUTROPHILS RELATIVE PERCENT: 77.4 % (ref 36–66)
SODIUM BLD-SCNC: 141 MMOL/L (ref 135–145)
SPECIMEN: NORMAL
TOTAL IMMATURE NEUTOROPHIL: 0.06 K/CU MM
TOTAL NUCLEATED RBC: 0 K/CU MM
TOTAL PROTEIN: 5.1 GM/DL (ref 6.4–8.2)
WBC # BLD: 10.5 K/CU MM (ref 4–10.5)

## 2021-08-10 PROCEDURE — 94761 N-INVAS EAR/PLS OXIMETRY MLT: CPT

## 2021-08-10 PROCEDURE — 6360000002 HC RX W HCPCS: Performed by: INTERNAL MEDICINE

## 2021-08-10 PROCEDURE — 85730 THROMBOPLASTIN TIME PARTIAL: CPT

## 2021-08-10 PROCEDURE — 6370000000 HC RX 637 (ALT 250 FOR IP): Performed by: INTERNAL MEDICINE

## 2021-08-10 PROCEDURE — 36415 COLL VENOUS BLD VENIPUNCTURE: CPT

## 2021-08-10 PROCEDURE — 82150 ASSAY OF AMYLASE: CPT

## 2021-08-10 PROCEDURE — 2060000000 HC ICU INTERMEDIATE R&B

## 2021-08-10 PROCEDURE — 93010 ELECTROCARDIOGRAM REPORT: CPT | Performed by: INTERNAL MEDICINE

## 2021-08-10 PROCEDURE — 2580000003 HC RX 258: Performed by: INTERNAL MEDICINE

## 2021-08-10 PROCEDURE — 85610 PROTHROMBIN TIME: CPT

## 2021-08-10 PROCEDURE — 2580000003 HC RX 258: Performed by: EMERGENCY MEDICINE

## 2021-08-10 PROCEDURE — 85025 COMPLETE CBC W/AUTO DIFF WBC: CPT

## 2021-08-10 PROCEDURE — 83690 ASSAY OF LIPASE: CPT

## 2021-08-10 PROCEDURE — 80053 COMPREHEN METABOLIC PANEL: CPT

## 2021-08-10 PROCEDURE — 99232 SBSQ HOSP IP/OBS MODERATE 35: CPT | Performed by: SURGERY

## 2021-08-10 RX ORDER — POTASSIUM CHLORIDE 7.45 MG/ML
10 INJECTION INTRAVENOUS
Status: COMPLETED | OUTPATIENT
Start: 2021-08-10 | End: 2021-08-10

## 2021-08-10 RX ADMIN — MIDODRINE HYDROCHLORIDE 10 MG: 5 TABLET ORAL at 11:33

## 2021-08-10 RX ADMIN — MIDODRINE HYDROCHLORIDE 10 MG: 5 TABLET ORAL at 17:09

## 2021-08-10 RX ADMIN — FOSPHENYTOIN SODIUM 200 MG PE: 50 INJECTION, SOLUTION INTRAMUSCULAR; INTRAVENOUS at 19:57

## 2021-08-10 RX ADMIN — POTASSIUM CHLORIDE 10 MEQ: 7.46 INJECTION, SOLUTION INTRAVENOUS at 08:22

## 2021-08-10 RX ADMIN — FOSPHENYTOIN SODIUM 200 MG PE: 50 INJECTION, SOLUTION INTRAMUSCULAR; INTRAVENOUS at 08:21

## 2021-08-10 RX ADMIN — SODIUM CHLORIDE, PRESERVATIVE FREE 10 ML: 5 INJECTION INTRAVENOUS at 08:24

## 2021-08-10 RX ADMIN — POTASSIUM CHLORIDE AND SODIUM CHLORIDE: 900; 150 INJECTION, SOLUTION INTRAVENOUS at 06:20

## 2021-08-10 RX ADMIN — SODIUM CHLORIDE, PRESERVATIVE FREE 10 ML: 5 INJECTION INTRAVENOUS at 08:23

## 2021-08-10 RX ADMIN — MIDODRINE HYDROCHLORIDE 10 MG: 5 TABLET ORAL at 08:06

## 2021-08-10 RX ADMIN — POTASSIUM CHLORIDE 10 MEQ: 7.46 INJECTION, SOLUTION INTRAVENOUS at 10:05

## 2021-08-10 RX ADMIN — CEFEPIME HYDROCHLORIDE 1000 MG: 1 INJECTION, POWDER, FOR SOLUTION INTRAMUSCULAR; INTRAVENOUS at 03:55

## 2021-08-10 RX ADMIN — POTASSIUM CHLORIDE 10 MEQ: 7.46 INJECTION, SOLUTION INTRAVENOUS at 13:05

## 2021-08-10 RX ADMIN — CEFEPIME HYDROCHLORIDE 1000 MG: 1 INJECTION, POWDER, FOR SOLUTION INTRAMUSCULAR; INTRAVENOUS at 17:09

## 2021-08-10 RX ADMIN — POTASSIUM CHLORIDE 10 MEQ: 7.46 INJECTION, SOLUTION INTRAVENOUS at 11:41

## 2021-08-10 RX ADMIN — VANCOMYCIN 1000 MG: 1 INJECTION, SOLUTION INTRAVENOUS at 06:19

## 2021-08-10 NOTE — PROGRESS NOTES
GENERAL SURGERY PROGRESS NOTE    CC/HPI:           Patient feels OK tolerating liquid diet. .. Vitals:    08/09/21 2358 08/10/21 0415 08/10/21 0700 08/10/21 1141   BP: 103/75 100/72  108/77   Pulse: 92 77  76   Resp: 16 17 13 16   Temp: 97.9 °F (36.6 °C) 98.7 °F (37.1 °C)  97.5 °F (36.4 °C)   TempSrc: Oral Oral  Axillary   SpO2:  93% 94%    Weight:  129 lb 6.6 oz (58.7 kg)     Height:         I/O last 3 completed shifts: In: 2985.7 [P.O.:680; I.V.:2005.7; IV Piggyback:300]  Out: 110 [Urine:110]  No intake/output data recorded. ADULT DIET;  Clear Liquid    Recent Results (from the past 48 hour(s))   EKG 12 Lead    Collection Time: 08/09/21 12:53 AM   Result Value Ref Range    Ventricular Rate 99 BPM    Atrial Rate 99 BPM    P-R Interval 152 ms    QRS Duration 90 ms    Q-T Interval 368 ms    QTc Calculation (Bazett) 472 ms    P Axis 59 degrees    R Axis -24 degrees    T Axis 39 degrees    Diagnosis       Normal sinus rhythm  Nonspecific ST abnormality  Abnormal ECG  No previous ECGs available  Confirmed by Adan Gerber ([id number]) on 8/10/2021 1:16:08 PM     CBC with Auto Diff    Collection Time: 08/09/21  1:14 AM   Result Value Ref Range    WBC 12.6 (H) 4.0 - 10.5 K/CU MM    RBC 5.15 4.6 - 6.2 M/CU MM    Hemoglobin 15.8 13.5 - 18.0 GM/DL    Hematocrit 48.3 42 - 52 %    MCV 93.8 78 - 100 FL    MCH 30.7 27 - 31 PG    MCHC 32.7 32.0 - 36.0 %    RDW 14.8 11.7 - 14.9 %    Platelets 204 157 - 970 K/CU MM    MPV 10.0 7.5 - 11.1 FL    Differential Type AUTOMATED DIFFERENTIAL     Segs Relative 85.4 (H) 36 - 66 %    Lymphocytes % 9.7 (L) 24 - 44 %    Monocytes % 4.0 0 - 4 %    Eosinophils % 0.2 0 - 3 %    Basophils % 0.2 0 - 1 %    Segs Absolute 10.8 K/CU MM    Lymphocytes Absolute 1.2 K/CU MM    Monocytes Absolute 0.5 K/CU MM    Eosinophils Absolute 0.0 K/CU MM    Basophils Absolute 0.0 K/CU MM    Nucleated RBC % 0.0 %    Total Nucleated RBC 0.0 K/CU MM    Total Immature Neutrophil 0.06 K/CU MM    Immature Neutrophil % 0.5 (H) 0 - 0.43 %   CMP    Collection Time: 08/09/21  1:14 AM   Result Value Ref Range    Sodium 142 135 - 145 MMOL/L    Potassium 3.3 (L) 3.5 - 5.1 MMOL/L    Chloride 101 99 - 110 mMol/L    CO2 25 21 - 32 MMOL/L    BUN 12 6 - 23 MG/DL    CREATININE 0.8 (L) 0.9 - 1.3 MG/DL    Glucose 117 (H) 70 - 99 MG/DL    Calcium 8.9 8.3 - 10.6 MG/DL    Albumin 3.6 3.4 - 5.0 GM/DL    Total Protein 6.9 6.4 - 8.2 GM/DL    Total Bilirubin 0.2 0.0 - 1.0 MG/DL    ALT 9 (L) 10 - 40 U/L    AST 15 15 - 37 IU/L    Alkaline Phosphatase 196 (H) 40 - 128 IU/L    GFR Non-African American >60 >60 mL/min/1.73m2    GFR African American >60 >60 mL/min/1.73m2    Anion Gap 16 4 - 16   Lipase    Collection Time: 08/09/21  1:14 AM   Result Value Ref Range    Lipase 23 13 - 60 IU/L   Lactic acid, plasma    Collection Time: 08/09/21  1:14 AM   Result Value Ref Range    Lactate 2.7 (HH) 0.4 - 2.0 mMOL/L   Culture, Blood 1    Collection Time: 08/09/21  1:14 AM    Specimen: Blood gases   Result Value Ref Range    Specimen BLOOD     Special Requests NONE     Culture NO GROWTH AT 24 HOURS    Culture, Blood 2    Collection Time: 08/09/21  1:14 AM    Specimen: Blood gases   Result Value Ref Range    Specimen BLOOD     Special Requests NONE     Culture NO GROWTH AT 24 HOURS    Troponin    Collection Time: 08/09/21  1:14 AM   Result Value Ref Range    Troponin T 0.012 (H) <0.01 NG/ML   Procalcitonin    Collection Time: 08/09/21  1:14 AM   Result Value Ref Range    Procalcitonin 0.042    Troponin    Collection Time: 08/09/21  6:40 AM   Result Value Ref Range    Troponin T 0.013 (H) <0.01 NG/ML   Urinalysis with microscopic    Collection Time: 08/09/21 10:45 AM   Result Value Ref Range    Color, UA YELLOW YELLOW    Clarity, UA CLEAR CLEAR    Glucose, Urine NEGATIVE NEGATIVE MG/DL    Bilirubin Urine NEGATIVE NEGATIVE MG/DL    Ketones, Urine MODERATE (A) NEGATIVE MG/DL    Specific Gravity, UA 1.029 1.001 - 1.035    Blood, Urine SMALL (A) NEGATIVE pH, Urine 5.0 5.0 - 8.0    Protein, UA NEGATIVE NEGATIVE MG/DL    Urobilinogen, Urine NEGATIVE 0.2 - 1.0 MG/DL    Nitrite Urine, Quantitative NEGATIVE NEGATIVE    Leukocyte Esterase, Urine NEGATIVE NEGATIVE    RBC, UA 5 (H) 0 - 3 /HPF    WBC, UA 2 0 - 2 /HPF    Bacteria, UA NEGATIVE NEGATIVE /HPF    Squam Epithel, UA <1 /HPF    Mucus, UA RARE (A) NEGATIVE HPF    Trichomonas, UA NONE SEEN NONE SEEN /HPF    Unclassified Cast 3 /LPF    Hyaline Casts, UA 0 /LPF   Culture, Urine    Collection Time: 08/09/21 10:45 AM    Specimen: Urine, clean catch   Result Value Ref Range    Specimen URINE CLEAN CATCH     Special Requests NONE     Culture Final Report No growth at 18 to 36 hours    Lactic Acid, Plasma    Collection Time: 08/09/21  6:10 PM   Result Value Ref Range    Lactate 1.7 0.4 - 2.0 mMOL/L   Amylase    Collection Time: 08/10/21  6:31 AM   Result Value Ref Range    Amylase 43 25 - 115 U/L   CBC Auto Differential    Collection Time: 08/10/21  6:31 AM   Result Value Ref Range    WBC 10.5 4.0 - 10.5 K/CU MM    RBC 4.39 (L) 4.6 - 6.2 M/CU MM    Hemoglobin 13.3 (L) 13.5 - 18.0 GM/DL    Hematocrit 41.3 (L) 42 - 52 %    MCV 94.1 78 - 100 FL    MCH 30.3 27 - 31 PG    MCHC 32.2 32.0 - 36.0 %    RDW 14.8 11.7 - 14.9 %    Platelets 701 966 - 111 K/CU MM    MPV 9.7 7.5 - 11.1 FL    Differential Type AUTOMATED DIFFERENTIAL     Segs Relative 77.4 (H) 36 - 66 %    Lymphocytes % 14.7 (L) 24 - 44 %    Monocytes % 5.3 (H) 0 - 4 %    Eosinophils % 1.7 0 - 3 %    Basophils % 0.3 0 - 1 %    Segs Absolute 8.1 K/CU MM    Lymphocytes Absolute 1.5 K/CU MM    Monocytes Absolute 0.6 K/CU MM    Eosinophils Absolute 0.2 K/CU MM    Basophils Absolute 0.0 K/CU MM    Nucleated RBC % 0.0 %    Total Nucleated RBC 0.0 K/CU MM    Total Immature Neutrophil 0.06 K/CU MM    Immature Neutrophil % 0.6 (H) 0 - 0.43 %   Comprehensive Metabolic Panel    Collection Time: 08/10/21  6:31 AM   Result Value Ref Range    Sodium 141 135 - 145 MMOL/L    Potassium 3.0 (LL) 3.5 - 5.1 MMOL/L    Chloride 108 99 - 110 mMol/L    CO2 24 21 - 32 MMOL/L    BUN 9 6 - 23 MG/DL    CREATININE 0.6 (L) 0.9 - 1.3 MG/DL    Glucose 83 70 - 99 MG/DL    Calcium 7.5 (L) 8.3 - 10.6 MG/DL    Albumin 3.1 (L) 3.4 - 5.0 GM/DL    Total Protein 5.1 (L) 6.4 - 8.2 GM/DL    Total Bilirubin 0.1 0.0 - 1.0 MG/DL    ALT 6 (L) 10 - 40 U/L    AST 11 (L) 15 - 37 IU/L    Alkaline Phosphatase 140 (H) 40 - 129 IU/L    GFR Non-African American >60 >60 mL/min/1.73m2    GFR African American >60 >60 mL/min/1.73m2    Anion Gap 9 4 - 16   Lipase    Collection Time: 08/10/21  6:31 AM   Result Value Ref Range    Lipase 28 13 - 60 IU/L   Protime/INR & PTT    Collection Time: 08/10/21  6:31 AM   Result Value Ref Range    Protime 19.5 (H) 11.7 - 14.5 SECONDS    INR 1.51 INDEX    aPTT 27.2 25.1 - 37.1 SECONDS       Scheduled Meds:   sodium chloride flush  5-40 mL Intravenous BID    sodium chloride flush  5-40 mL Intravenous 2 times per day    sodium chloride flush  5-40 mL Intravenous 2 times per day    cefepime  1,000 mg Intravenous Q12H    midodrine  10 mg Oral TID WC    fosphenytoin  200 mg PE Intravenous Q12H    vancomycin  1,000 mg Intravenous Q24H       Continuous Infusions:   sodium chloride      0.9% NaCl with KCl 20 mEq 100 mL/hr at 08/10/21 0620       Physical Exam:  HEENT: Anicteric sclerae, Oropharyngeal mucosae moist, pink and intact. Heart:  Normal S1 and S2, RRR  Lungs: Clear to auscultation bilaterally, No audible Wheezes or Rales. Extremities: No edema. Neuro: Awake comfortable, Non focal.  Abdomen: Soft, Benign, Non tender, Non distended, Positive bowel sounds. Active Problems:    Large bowel obstruction (HCC)  Resolved Problems:    * No resolved hospital problems. *      Assessment and Plan:  Cristina Nieves is a 80 y.o. male with constipation, a CT revealing a large bowel obstruction, and questioning a rectal obstructing mass. . D/W Dr Derrek Hess yesterday, and he will colonoscope him soon and let me know, I discussed with his daughter yesterday, and she wants NO surgical interventions. ..    ___________________________________________    Frantz Summers MD, FACS, FICS  8/10/2021  2:28 PM

## 2021-08-10 NOTE — PROGRESS NOTES
DOING WELL AWAKE ALERT HAD BM WITH NO BLOOD TOLERATING CLEAR LIQUIDS WELL VITALS STABLE  LABS NOTED   WILL DO COLONOSCOPY ON 08/12/21

## 2021-08-10 NOTE — PROGRESS NOTES
Hospital Medicine: Progress Note     Kamini Hernandez  7/20/1932         Admit Date: 8/8/2021   Primary Care Physician:  No primary care provider on file. /77   Pulse 76   Temp 97.5 °F (36.4 °C) (Axillary)   Resp 16   Ht 5' 9\" (1.753 m)   Wt 129 lb 6.6 oz (58.7 kg)   SpO2 94%   BMI 19.11 kg/m²     Chief Complaint:    Abdominal pain, nausea and vomiting. Perpetual history:   35-year-old male who presented from [facility] with nausea, vomiting and diarrhea. He was unable to provide information. He was pointing to the epigastric region and complaining of pain. CXR showed markedly distended air-filled loops of bowel within the upper abdomen, contrast CT of the abdomen and pelvis shows circumferential thickening of the rectum concerning for neoplasm. He was admitted for further evaluation. Interval History:    Seen and examined at bedside during rounds, resting in bed, awake and alert, no apparent distress. The daughter was visiting. He denies abdominal pain, nausea or vomiting. He was attempting to eat ice cream.  Nursing has no pressing concerns. Impression/Plan:    #Nausea and vomiting:  -Probably related to bowel obstruction.  -Nausea is improved. -He is now on liquid diet. -GI planning colonoscopy on 8/12. #Concern for infectious colitis:  -Continue empiric antibiotics. -Pending stool studies. #SIRS:  -Suspect GI infection.  -Unremarkable CXR. -Continue broad coverage antibiotics. -Awaiting culture data stool studies. #Elevated troponin:  -No CP, pressure. -EKG with no acute ischemic changes.  -Reviewed 2D echocardiogram, EF of 50 to 55%. -Normal LV systolic function.  -Evaluated by cardiology, no further work-up at this time. #Chronic type B dissection:  -Reviewed CT of the abdomen pelvis.  -No acute intervention at this time.  -Outpatient CT surgery follow-up. #Hypokalemia:  -Replacing potassium deficits.     Disposition plan:   Continue current management, will follow

## 2021-08-10 NOTE — PROGRESS NOTES
[address] [facility]  consulted by Dr. Danielle Ferreira for monitoring and adjustment. Indication for treatment: Sepsis of unknown etiology  Goal trough: 15 mcg/mL  AUC/RODRIGUE: 400-600    Pertinent Laboratory Values:   Temp Readings from Last 3 Encounters:   08/10/21 97.5 °F (36.4 °C) (Axillary)     Recent Labs     08/09/21  0114 08/09/21  1810 08/10/21  0631   WBC 12.6*  --  10.5   LACTATE 2.7* 1.7  --      Recent Labs     08/09/21  0114 08/10/21  0631   BUN 12 9   CREATININE 0.8* 0.6*     Estimated Creatinine Clearance: 69 mL/min (A) (based on SCr of 0.6 mg/dL (L)). Intake/Output Summary (Last 24 hours) at 8/10/2021 1329  Last data filed at 8/10/2021 0556  Gross per 24 hour   Intake 2485.67 ml   Output --   Net 2485.67 ml       Pertinent Cultures:  Date    Source    Results  8/09   Urine    Ordered  8/09   Blood    Ordered    Vancomycin level:   TROUGH:  No results for input(s): VANCOTROUGH in the last 72 hours. RANDOM:  No results for input(s): VANCORANDOM in the last 72 hours.     Assessment:  · WBC and temperature: WBC @ 10.5; afebrile  · SCr, BUN, and urine output: WNL, stable  · Day(s) of therapy: 2  · Vancomycin concentration:  · 8/11: to be collected    Plan:  · Vancomycin 1250 mg x 1, followed by 1000 mg IVPB q24h  · Pharmacy will continue to monitor patient and adjust therapy as indicated    [address] 8/11/21 @3406    Thank you for the consult,  Xin Seaman, [facility]   8/10/2021 1:29 PM

## 2021-08-11 ENCOUNTER — ANESTHESIA EVENT (OUTPATIENT)
Dept: ENDOSCOPY | Age: 86
DRG: 871 | End: 2021-08-11
Payer: MEDICARE

## 2021-08-11 PROBLEM — E43 SEVERE MALNUTRITION (HCC): Chronic | Status: ACTIVE | Noted: 2021-08-11

## 2021-08-11 LAB
ALBUMIN SERPL-MCNC: 3 GM/DL (ref 3.4–5)
ALP BLD-CCNC: 150 IU/L (ref 40–128)
ALT SERPL-CCNC: <5 U/L (ref 10–40)
ANION GAP SERPL CALCULATED.3IONS-SCNC: 10 MMOL/L (ref 4–16)
APTT: 26 SECONDS (ref 25.1–37.1)
AST SERPL-CCNC: 12 IU/L (ref 15–37)
BASOPHILS ABSOLUTE: 0.1 K/CU MM
BASOPHILS RELATIVE PERCENT: 0.5 % (ref 0–1)
BILIRUB SERPL-MCNC: 0.2 MG/DL (ref 0–1)
BUN BLDV-MCNC: 6 MG/DL (ref 6–23)
CALCIUM SERPL-MCNC: 8 MG/DL (ref 8.3–10.6)
CHLORIDE BLD-SCNC: 104 MMOL/L (ref 99–110)
CO2: 24 MMOL/L (ref 21–32)
CREAT SERPL-MCNC: 0.4 MG/DL (ref 0.9–1.3)
DIFFERENTIAL TYPE: ABNORMAL
DOSE AMOUNT: ABNORMAL
DOSE TIME: ABNORMAL
EOSINOPHILS ABSOLUTE: 0.3 K/CU MM
EOSINOPHILS RELATIVE PERCENT: 2.6 % (ref 0–3)
GFR AFRICAN AMERICAN: >60 ML/MIN/1.73M2
GFR NON-AFRICAN AMERICAN: >60 ML/MIN/1.73M2
GLUCOSE BLD-MCNC: 90 MG/DL (ref 70–99)
HCT VFR BLD CALC: 45.1 % (ref 42–52)
HEMOGLOBIN: 14.1 GM/DL (ref 13.5–18)
IMMATURE NEUTROPHIL %: 0.5 % (ref 0–0.43)
INR BLD: 1.43 INDEX
LYMPHOCYTES ABSOLUTE: 2.1 K/CU MM
LYMPHOCYTES RELATIVE PERCENT: 20.3 % (ref 24–44)
MAGNESIUM: 1.6 MG/DL (ref 1.8–2.4)
MCH RBC QN AUTO: 29.4 PG (ref 27–31)
MCHC RBC AUTO-ENTMCNC: 31.3 % (ref 32–36)
MCV RBC AUTO: 94.2 FL (ref 78–100)
MONOCYTES ABSOLUTE: 0.7 K/CU MM
MONOCYTES RELATIVE PERCENT: 6.9 % (ref 0–4)
NUCLEATED RBC %: 0 %
PDW BLD-RTO: 14.6 % (ref 11.7–14.9)
PLATELET # BLD: 219 K/CU MM (ref 140–440)
PMV BLD AUTO: 10.1 FL (ref 7.5–11.1)
POTASSIUM SERPL-SCNC: 3 MMOL/L (ref 3.5–5.1)
PROTHROMBIN TIME: 18.5 SECONDS (ref 11.7–14.5)
RBC # BLD: 4.79 M/CU MM (ref 4.6–6.2)
SARS-COV-2, NAAT: NOT DETECTED
SEGMENTED NEUTROPHILS ABSOLUTE COUNT: 7.3 K/CU MM
SEGMENTED NEUTROPHILS RELATIVE PERCENT: 69.2 % (ref 36–66)
SODIUM BLD-SCNC: 138 MMOL/L (ref 135–145)
SOURCE: NORMAL
TOTAL IMMATURE NEUTOROPHIL: 0.05 K/CU MM
TOTAL NUCLEATED RBC: 0 K/CU MM
TOTAL PROTEIN: 5.4 GM/DL (ref 6.4–8.2)
VANCOMYCIN TROUGH: 8.4 UG/ML (ref 10–20)
WBC # BLD: 10.5 K/CU MM (ref 4–10.5)

## 2021-08-11 PROCEDURE — 83735 ASSAY OF MAGNESIUM: CPT

## 2021-08-11 PROCEDURE — 6360000002 HC RX W HCPCS: Performed by: INTERNAL MEDICINE

## 2021-08-11 PROCEDURE — 2580000003 HC RX 258: Performed by: INTERNAL MEDICINE

## 2021-08-11 PROCEDURE — 85025 COMPLETE CBC W/AUTO DIFF WBC: CPT

## 2021-08-11 PROCEDURE — 85730 THROMBOPLASTIN TIME PARTIAL: CPT

## 2021-08-11 PROCEDURE — 6360000002 HC RX W HCPCS: Performed by: NURSE PRACTITIONER

## 2021-08-11 PROCEDURE — 6370000000 HC RX 637 (ALT 250 FOR IP): Performed by: SPECIALIST

## 2021-08-11 PROCEDURE — 80202 ASSAY OF VANCOMYCIN: CPT

## 2021-08-11 PROCEDURE — 2580000003 HC RX 258: Performed by: EMERGENCY MEDICINE

## 2021-08-11 PROCEDURE — 80053 COMPREHEN METABOLIC PANEL: CPT

## 2021-08-11 PROCEDURE — 36415 COLL VENOUS BLD VENIPUNCTURE: CPT

## 2021-08-11 PROCEDURE — 94761 N-INVAS EAR/PLS OXIMETRY MLT: CPT

## 2021-08-11 PROCEDURE — 6370000000 HC RX 637 (ALT 250 FOR IP): Performed by: INTERNAL MEDICINE

## 2021-08-11 PROCEDURE — 2140000000 HC CCU INTERMEDIATE R&B

## 2021-08-11 PROCEDURE — 99232 SBSQ HOSP IP/OBS MODERATE 35: CPT | Performed by: SURGERY

## 2021-08-11 PROCEDURE — 94640 AIRWAY INHALATION TREATMENT: CPT

## 2021-08-11 PROCEDURE — 85610 PROTHROMBIN TIME: CPT

## 2021-08-11 PROCEDURE — 87635 SARS-COV-2 COVID-19 AMP PRB: CPT

## 2021-08-11 RX ORDER — ALBUTEROL SULFATE 2.5 MG/3ML
2.5 SOLUTION RESPIRATORY (INHALATION) EVERY 6 HOURS PRN
Status: DISCONTINUED | OUTPATIENT
Start: 2021-08-11 | End: 2021-08-13 | Stop reason: HOSPADM

## 2021-08-11 RX ORDER — POTASSIUM CHLORIDE 7.45 MG/ML
10 INJECTION INTRAVENOUS ONCE
Status: COMPLETED | OUTPATIENT
Start: 2021-08-11 | End: 2021-08-11

## 2021-08-11 RX ORDER — MAGNESIUM SULFATE IN WATER 40 MG/ML
2000 INJECTION, SOLUTION INTRAVENOUS ONCE
Status: COMPLETED | OUTPATIENT
Start: 2021-08-11 | End: 2021-08-11

## 2021-08-11 RX ORDER — HALOPERIDOL 1 MG/1
1 TABLET ORAL ONCE
Status: COMPLETED | OUTPATIENT
Start: 2021-08-11 | End: 2021-08-11

## 2021-08-11 RX ORDER — POTASSIUM CHLORIDE 7.45 MG/ML
40 INJECTION INTRAVENOUS ONCE
Status: DISCONTINUED | OUTPATIENT
Start: 2021-08-11 | End: 2021-08-11 | Stop reason: SDUPTHER

## 2021-08-11 RX ADMIN — FOSPHENYTOIN SODIUM 200 MG PE: 50 INJECTION, SOLUTION INTRAMUSCULAR; INTRAVENOUS at 11:41

## 2021-08-11 RX ADMIN — MAGNESIUM SULFATE HEPTAHYDRATE 2000 MG: 40 INJECTION, SOLUTION INTRAVENOUS at 11:17

## 2021-08-11 RX ADMIN — POTASSIUM CHLORIDE 10 MEQ: 7.46 INJECTION, SOLUTION INTRAVENOUS at 14:23

## 2021-08-11 RX ADMIN — POTASSIUM CHLORIDE 10 MEQ: 7.46 INJECTION, SOLUTION INTRAVENOUS at 12:00

## 2021-08-11 RX ADMIN — HALOPERIDOL 1 MG: 1 TABLET ORAL at 14:07

## 2021-08-11 RX ADMIN — FOSPHENYTOIN SODIUM 200 MG PE: 50 INJECTION, SOLUTION INTRAMUSCULAR; INTRAVENOUS at 22:42

## 2021-08-11 RX ADMIN — SODIUM CHLORIDE, PRESERVATIVE FREE 10 ML: 5 INJECTION INTRAVENOUS at 11:41

## 2021-08-11 RX ADMIN — MIDODRINE HYDROCHLORIDE 10 MG: 5 TABLET ORAL at 11:17

## 2021-08-11 RX ADMIN — CEFEPIME HYDROCHLORIDE 1000 MG: 1 INJECTION, POWDER, FOR SOLUTION INTRAMUSCULAR; INTRAVENOUS at 03:53

## 2021-08-11 RX ADMIN — POTASSIUM CHLORIDE 10 MEQ: 7.46 INJECTION, SOLUTION INTRAVENOUS at 13:30

## 2021-08-11 RX ADMIN — MIDODRINE HYDROCHLORIDE 10 MG: 5 TABLET ORAL at 16:52

## 2021-08-11 RX ADMIN — POTASSIUM CHLORIDE AND SODIUM CHLORIDE: 900; 150 INJECTION, SOLUTION INTRAVENOUS at 16:59

## 2021-08-11 RX ADMIN — ALBUTEROL SULFATE 2.5 MG: 2.5 SOLUTION RESPIRATORY (INHALATION) at 23:25

## 2021-08-11 RX ADMIN — POLYETHYLENE GLYCOL 3350, SODIUM SULFATE ANHYDROUS, SODIUM BICARBONATE, SODIUM CHLORIDE, POTASSIUM CHLORIDE 4000 ML: 236; 22.74; 6.74; 5.86; 2.97 POWDER, FOR SOLUTION ORAL at 16:53

## 2021-08-11 RX ADMIN — VANCOMYCIN 1000 MG: 1 INJECTION, SOLUTION INTRAVENOUS at 05:38

## 2021-08-11 RX ADMIN — CEFEPIME HYDROCHLORIDE 1000 MG: 1 INJECTION, POWDER, FOR SOLUTION INTRAMUSCULAR; INTRAVENOUS at 16:52

## 2021-08-11 RX ADMIN — SODIUM CHLORIDE, PRESERVATIVE FREE 10 ML: 5 INJECTION INTRAVENOUS at 11:42

## 2021-08-11 RX ADMIN — POTASSIUM CHLORIDE AND SODIUM CHLORIDE: 900; 150 INJECTION, SOLUTION INTRAVENOUS at 01:14

## 2021-08-11 NOTE — CONSULTS
Comprehensive Nutrition Assessment    Type and Reason for Visit:  Initial, Consult (nutrition assessment for ollie score)    Nutrition Recommendations/Plan:   · Continue Clear Liquid Diet with clear liquid oral nutrition supplement tid  · Replace depleted electrolytes as needed  · Advance to at least a Full Liquid Diet with standard high safia supplements as timely as able    Nutrition Assessment:  Pt admitted from [facility] with nausea, vomiting and diarrhea, CT revealing a large bowel obstruction, and questioning a rectal obstructing mass. Family do not want major surgical intervention noted. H/O CHF, CAD, CABG, dementia. Ollie nutrition score of 2. Tolerating Clear Liquid Diet, planned colonoscopy tomorrow. Wt history is n/a. Pt meets criteria for severe malnutrition. Will order clear liquid oral nutrition supplement for now, and continue to follow as high nutrition risk pending an adequate source of nutrition. Malnutrition Assessment:  Malnutrition Status:  Severe malnutrition    Context:  Acute Illness     Findings of the 6 clinical characteristics of malnutrition:  Energy Intake:  50% or less of estimated energy requirements for 5 or more days  Weight Loss:  Unable to assess     Body Fat Loss: Moderate body fat loss Triceps, Buccal region   Muscle Mass Loss: Moderate muscle mass loss Clavicles (pectoralis & deltoids), Hand (interosseous)  Fluid Accumulation:  No significant fluid accumulation Extremities   Strength:  Not Performed    Estimated Daily Nutrient Needs:  Energy (kcal):  0212-3542 (30-33 kcal/kg); Weight Used for Energy Requirements:  Current     Protein (g):  73-85 (1.2-1.4 g/kg); Weight Used for Protein Requirements:  Current        Fluid (ml/day):  2104-3120; Method Used for Fluid Requirements:  1 ml/kcal      Nutrition Related Findings:  K 3, Ca 8      Wounds:  None       Current Nutrition Therapies:    ADULT DIET;  Clear Liquid  Adult Oral Nutrition Supplement; Clear Liquid Oral Supplement    Anthropometric Measures:  · Height: 5' 9\" (175.3 cm)  · Current Body Weight: 134 lb 14.7 oz (61.2 kg)   · Admission Body Weight: 126 lb 12.2 oz (57.5 kg)    · Usual Body Weight:  (?)     · Ideal Body Weight: 160 lbs; % Ideal Body Weight 84.3 %   · BMI: 19.9  · BMI Categories: Underweight (BMI less than 22) age over 72       Nutrition Diagnosis:   · Severe malnutrition, In context of acute illness or injury related to inadequate protein-energy intake as evidenced by NPO or clear liquid status due to medical condition, poor intake prior to admission, moderate loss of subcutaneous fat, moderate muscle loss    Nutrition Interventions:   Food and/or Nutrient Delivery:  Continue Current Diet, Start Oral Nutrition Supplement  Nutrition Education/Counseling:  No recommendation at this time   Coordination of Nutrition Care:  Continue to monitor while inpatient, Feeding Assistance/Environment Change    Goals:  Pt will tolerate at least a Full Liquid Diet within 48 hr       Nutrition Monitoring and Evaluation:   Behavioral-Environmental Outcomes:  None Identified   Food/Nutrient Intake Outcomes:  Diet Advancement/Tolerance, Food and Nutrient Intake, Supplement Intake  Physical Signs/Symptoms Outcomes:  Biochemical Data, Constipation, Diarrhea, GI Status, Nausea or Vomiting, Meal Time Behavior, Nutrition Focused Physical Findings, Weight     Discharge Planning:    Continue Oral Nutrition Supplement     Electronically signed by Sabine Carranza RD, LD on 8/11/21 at 11:01 AM EDT    Contact: [phone]

## 2021-08-11 NOTE — PROGRESS NOTES
notified pt pulling lines trying to get out of bed and confused. Pt family leaving an concerned about pt safety.  New order placed

## 2021-08-11 NOTE — PROGRESS NOTES
Amita served Dr. Sasha King on this patient's morning critical potassium of 3.0. Waiting on response.

## 2021-08-11 NOTE — PROGRESS NOTES
Amylase 43 25 - 115 U/L   CBC Auto Differential    Collection Time: 08/10/21  6:31 AM   Result Value Ref Range    WBC 10.5 4.0 - 10.5 K/CU MM    RBC 4.39 (L) 4.6 - 6.2 M/CU MM    Hemoglobin 13.3 (L) 13.5 - 18.0 GM/DL    Hematocrit 41.3 (L) 42 - 52 %    MCV 94.1 78 - 100 FL    MCH 30.3 27 - 31 PG    MCHC 32.2 32.0 - 36.0 %    RDW 14.8 11.7 - 14.9 %    Platelets 970 347 - 840 K/CU MM    MPV 9.7 7.5 - 11.1 FL    Differential Type AUTOMATED DIFFERENTIAL     Segs Relative 77.4 (H) 36 - 66 %    Lymphocytes % 14.7 (L) 24 - 44 %    Monocytes % 5.3 (H) 0 - 4 %    Eosinophils % 1.7 0 - 3 %    Basophils % 0.3 0 - 1 %    Segs Absolute 8.1 K/CU MM    Lymphocytes Absolute 1.5 K/CU MM    Monocytes Absolute 0.6 K/CU MM    Eosinophils Absolute 0.2 K/CU MM    Basophils Absolute 0.0 K/CU MM    Nucleated RBC % 0.0 %    Total Nucleated RBC 0.0 K/CU MM    Total Immature Neutrophil 0.06 K/CU MM    Immature Neutrophil % 0.6 (H) 0 - 0.43 %   Comprehensive Metabolic Panel    Collection Time: 08/10/21  6:31 AM   Result Value Ref Range    Sodium 141 135 - 145 MMOL/L    Potassium 3.0 (LL) 3.5 - 5.1 MMOL/L    Chloride 108 99 - 110 mMol/L    CO2 24 21 - 32 MMOL/L    BUN 9 6 - 23 MG/DL    CREATININE 0.6 (L) 0.9 - 1.3 MG/DL    Glucose 83 70 - 99 MG/DL    Calcium 7.5 (L) 8.3 - 10.6 MG/DL    Albumin 3.1 (L) 3.4 - 5.0 GM/DL    Total Protein 5.1 (L) 6.4 - 8.2 GM/DL    Total Bilirubin 0.1 0.0 - 1.0 MG/DL    ALT 6 (L) 10 - 40 U/L    AST 11 (L) 15 - 37 IU/L    Alkaline Phosphatase 140 (H) 40 - 129 IU/L    GFR Non-African American >60 >60 mL/min/1.73m2    GFR African American >60 >60 mL/min/1.73m2    Anion Gap 9 4 - 16   Lipase    Collection Time: 08/10/21  6:31 AM   Result Value Ref Range    Lipase 28 13 - 60 IU/L   Protime/INR & PTT    Collection Time: 08/10/21  6:31 AM   Result Value Ref Range    Protime 19.5 (H) 11.7 - 14.5 SECONDS    INR 1.51 INDEX    aPTT 27.2 25.1 - 37.1 SECONDS   CBC Auto Differential    Collection Time: 08/11/21  4:41 AM   Result Value Ref Range    WBC 10.5 4.0 - 10.5 K/CU MM    RBC 4.79 4.6 - 6.2 M/CU MM    Hemoglobin 14.1 13.5 - 18.0 GM/DL    Hematocrit 45.1 42 - 52 %    MCV 94.2 78 - 100 FL    MCH 29.4 27 - 31 PG    MCHC 31.3 (L) 32.0 - 36.0 %    RDW 14.6 11.7 - 14.9 %    Platelets 242 663 - 859 K/CU MM    MPV 10.1 7.5 - 11.1 FL    Differential Type AUTOMATED DIFFERENTIAL     Segs Relative 69.2 (H) 36 - 66 %    Lymphocytes % 20.3 (L) 24 - 44 %    Monocytes % 6.9 (H) 0 - 4 %    Eosinophils % 2.6 0 - 3 %    Basophils % 0.5 0 - 1 %    Segs Absolute 7.3 K/CU MM    Lymphocytes Absolute 2.1 K/CU MM    Monocytes Absolute 0.7 K/CU MM    Eosinophils Absolute 0.3 K/CU MM    Basophils Absolute 0.1 K/CU MM    Nucleated RBC % 0.0 %    Total Nucleated RBC 0.0 K/CU MM    Total Immature Neutrophil 0.05 K/CU MM    Immature Neutrophil % 0.5 (H) 0 - 0.43 %   Protime/INR & PTT    Collection Time: 08/11/21  4:41 AM   Result Value Ref Range    Protime 18.5 (H) 11.7 - 14.5 SECONDS    INR 1.43 INDEX    aPTT 26.0 25.1 - 37.1 SECONDS       Scheduled Meds:   polyethylene glycol  4,000 mL Oral Once    sodium chloride flush  5-40 mL Intravenous BID    sodium chloride flush  5-40 mL Intravenous 2 times per day    sodium chloride flush  5-40 mL Intravenous 2 times per day    cefepime  1,000 mg Intravenous Q12H    midodrine  10 mg Oral TID WC    fosphenytoin  200 mg PE Intravenous Q12H    vancomycin  1,000 mg Intravenous Q24H       Continuous Infusions:   sodium chloride      0.9% NaCl with KCl 20 mEq 100 mL/hr at 08/11/21 0114       Physical Exam:  HEENT: Anicteric sclerae, Oropharyngeal mucosae moist, pink and intact. Heart:  Normal S1 and S2, RRR  Lungs: Clear to auscultation bilaterally, No audible Wheezes or Rales. Extremities: No edema. Neuro: Awake comfortable, Non focal.  Abdomen: Soft, Benign, Non tender, Non distended, Positive bowel sounds.       Active Problems:    Large bowel obstruction ([address]  Resolved Problems:    * No resolved hospital problems. *      Assessment and Plan:  Vida Miller is a 80 y.o. male with constipation, a CT revealing a large bowel obstruction, and questioning a rectal obstructing mass. . D/W Dr Robert Bragg the day before yesterday, and he will colonoscope him TOMORROW, and let me know, I discussed with his daughter the day before yesterday, and she wants NO MAJOR surgical interventions. .. Will follow.     ___________________________________________    David Patiño MD, FACS, FICS  8/11/2021  5:49 AM

## 2021-08-11 NOTE — PLAN OF CARE
Nutrition Problem #1: Severe malnutrition, In context of acute illness or injury  Intervention: Food and/or Nutrient Delivery: Continue Current Diet, Start Oral Nutrition Supplement  Nutritional Goals: Pt will tolerate at least a Full Liquid Diet within 48 hr

## 2021-08-11 NOTE — PROGRESS NOTES
[facility] Medicine: Progress Note     Lukasz Chester  7/20/1932         Admit Date: 8/8/2021   Primary Care Physician:  No primary care provider on file. /70   Pulse 73   Temp 97.5 °F (36.4 °C) (Oral)   Resp 16   Ht 5' 9\" (1.753 m)   Wt 134 lb 14.7 oz (61.2 kg)   SpO2 96%   BMI 19.92 kg/m²     Chief Complaint:    Abdominal pain, nausea and vomiting. Perpetual history:   80-year-old male who presented from [facility] with N/V/D. He was unable to provide information. He was pointing to the epigastric region and complaining of pain. CXR showed markedly distended air-filled loops of bowel within the upper abdomen, contrast CT of the abdomen and pelvis shows circumferential thickening of the rectum concerning for neoplasm. He was admitted for further evaluation. Interval History:    Doing well, resting in bed, no acute distress. Denies discomfort, no abdominal pain. Nursing has no concerns. Impression/Plan:    #Nausea and vomiting:  -Probably related to bowel obstruction.  -Nausea is resolved. -He is now on liquid diet. -GI planning colonoscopy on 8/12. #Concern for infectious colitis:  -Cultures negative thus far. -Deescalate abxs. #SIRS:  -Suspect GI infection.  -Unremarkable CXR. -Continue antibiotics. -Awaiting culture data stool studies. #Elevated troponin:  -No CP, pressure. -EKG with no acute ischemic changes.  -Reviewed 2D echocardiogram, EF of 50 to 55%. -Normal LV systolic function.  -Evaluated by cardiology, no further work-up at this time. #Chronic type B dissection:  -Reviewed CT of the abdomen pelvis.  -No acute intervention at this time.  -Outpatient CT surgery follow-up. #Hypokalemia:  -Replacing potassium and other deficits. Disposition plan:   Continue current management, will follow with C-scope result. Physical exam:  General appearance: Elderly male, resting in bed, no acute distress. Head/EENT: ncat, perrl, eomi, dmm.   Neck: supple, no meningismus. Chest: symmetric with equal expansion, no retractions. Lungs:clear to auscultation bilaterally, no egophony. Heart: normal s1s2, no added sounds. Abdomen: Distended, hyperresonant, positive bowel sounds, no G/R. Extremities: no c/c/e, good ROM x 4. Pulses: palpable and strong bilaterally. Skin: warm and dry. Neurologic: no focal deficits. Data Review:     CBC:   Recent Labs     08/09/21  0114 08/10/21  0631 08/11/21  0441   WBC 12.6* 10.5 10.5   HGB 15.8 13.3* 14.1    204 219     [place]. Panel:    Recent Labs     08/09/21  0114 08/10/21  0631 08/11/21  0441    141 138   K 3.3* 3.0* 3.0*    108 104   CO2 25 24 24   BUN 12 9 6   CREATININE 0.8* 0.6* 0.4*   GLUCOSE 117* 83 90     Hepatic:   Recent Labs     08/09/21  0114 08/10/21  0631 08/11/21  0441   AST 15 11* 12*   ALT 9* 6* <5*   BILITOT 0.2 0.1 0.2   ALKPHOS 196* 140* 150*     Meds:    sodium chloride flush  5-40 mL Intravenous BID    sodium chloride flush  5-40 mL Intravenous 2 times per day    sodium chloride flush  5-40 mL Intravenous 2 times per day    cefepime  1,000 mg Intravenous Q12H    midodrine  10 mg Oral TID WC    fosphenytoin  200 mg PE Intravenous Q12H    [START ON 8/10/2021] vancomycin  1,000 mg Intravenous Q24H     Kyara Gonzalez MD    Note: Computer voice recognition system was used in parts of this documentation. There is a possibility of sound alike word errors inherent to this technology that may be missed during proof-reading and may alter the context of this note. Please notify the Jasmin Barker of material inconsistencies.

## 2021-08-12 ENCOUNTER — APPOINTMENT (OUTPATIENT)
Dept: GENERAL RADIOLOGY | Age: 86
DRG: 871 | End: 2021-08-12
Payer: MEDICARE

## 2021-08-12 ENCOUNTER — ANESTHESIA (OUTPATIENT)
Dept: ENDOSCOPY | Age: 86
DRG: 871 | End: 2021-08-12
Payer: MEDICARE

## 2021-08-12 VITALS — OXYGEN SATURATION: 100 % | SYSTOLIC BLOOD PRESSURE: 132 MMHG | DIASTOLIC BLOOD PRESSURE: 79 MMHG

## 2021-08-12 LAB
ALBUMIN SERPL-MCNC: 3.3 GM/DL (ref 3.4–5)
ALP BLD-CCNC: 162 IU/L (ref 40–128)
ALT SERPL-CCNC: 7 U/L (ref 10–40)
AMYLASE: 55 U/L (ref 25–115)
ANION GAP SERPL CALCULATED.3IONS-SCNC: 10 MMOL/L (ref 4–16)
APTT: 26.1 SECONDS (ref 25.1–37.1)
AST SERPL-CCNC: 13 IU/L (ref 15–37)
BASOPHILS ABSOLUTE: 0 K/CU MM
BASOPHILS RELATIVE PERCENT: 0.3 % (ref 0–1)
BILIRUB SERPL-MCNC: 0.2 MG/DL (ref 0–1)
BUN BLDV-MCNC: 5 MG/DL (ref 6–23)
CALCIUM SERPL-MCNC: 8.2 MG/DL (ref 8.3–10.6)
CHLORIDE BLD-SCNC: 102 MMOL/L (ref 99–110)
CO2: 26 MMOL/L (ref 21–32)
CREAT SERPL-MCNC: 0.5 MG/DL (ref 0.9–1.3)
DIFFERENTIAL TYPE: ABNORMAL
DOSE AMOUNT: ABNORMAL
DOSE TIME: ABNORMAL
EOSINOPHILS ABSOLUTE: 0.1 K/CU MM
EOSINOPHILS RELATIVE PERCENT: 0.9 % (ref 0–3)
GFR AFRICAN AMERICAN: >60 ML/MIN/1.73M2
GFR NON-AFRICAN AMERICAN: >60 ML/MIN/1.73M2
GLUCOSE BLD-MCNC: 108 MG/DL (ref 70–99)
HCT VFR BLD CALC: 44.4 % (ref 42–52)
HEMOGLOBIN: 14.4 GM/DL (ref 13.5–18)
IMMATURE NEUTROPHIL %: 0.8 % (ref 0–0.43)
INR BLD: 1.41 INDEX
LIPASE: 34 IU/L (ref 13–60)
LYMPHOCYTES ABSOLUTE: 1.3 K/CU MM
LYMPHOCYTES RELATIVE PERCENT: 10.4 % (ref 24–44)
MCH RBC QN AUTO: 29.6 PG (ref 27–31)
MCHC RBC AUTO-ENTMCNC: 32.4 % (ref 32–36)
MCV RBC AUTO: 91.2 FL (ref 78–100)
MONOCYTES ABSOLUTE: 0.7 K/CU MM
MONOCYTES RELATIVE PERCENT: 5.4 % (ref 0–4)
NUCLEATED RBC %: 0 %
PDW BLD-RTO: 14.6 % (ref 11.7–14.9)
PHENYTOIN LEVEL: 21.9 UG/ML (ref 10–20)
PLATELET # BLD: 247 K/CU MM (ref 140–440)
PMV BLD AUTO: 10.3 FL (ref 7.5–11.1)
POTASSIUM SERPL-SCNC: 3.1 MMOL/L (ref 3.5–5.1)
PRO-BNP: 283.8 PG/ML
PROTHROMBIN TIME: 18.2 SECONDS (ref 11.7–14.5)
RBC # BLD: 4.87 M/CU MM (ref 4.6–6.2)
SEGMENTED NEUTROPHILS ABSOLUTE COUNT: 10.2 K/CU MM
SEGMENTED NEUTROPHILS RELATIVE PERCENT: 82.2 % (ref 36–66)
SODIUM BLD-SCNC: 138 MMOL/L (ref 135–145)
TOTAL IMMATURE NEUTOROPHIL: 0.1 K/CU MM
TOTAL NUCLEATED RBC: 0 K/CU MM
TOTAL PROTEIN: 5.8 GM/DL (ref 6.4–8.2)
WBC # BLD: 12.4 K/CU MM (ref 4–10.5)

## 2021-08-12 PROCEDURE — 6360000002 HC RX W HCPCS: Performed by: NURSE ANESTHETIST, CERTIFIED REGISTERED

## 2021-08-12 PROCEDURE — 6370000000 HC RX 637 (ALT 250 FOR IP): Performed by: SPECIALIST

## 2021-08-12 PROCEDURE — 71045 X-RAY EXAM CHEST 1 VIEW: CPT

## 2021-08-12 PROCEDURE — 85025 COMPLETE CBC W/AUTO DIFF WBC: CPT

## 2021-08-12 PROCEDURE — 83880 ASSAY OF NATRIURETIC PEPTIDE: CPT

## 2021-08-12 PROCEDURE — 6360000002 HC RX W HCPCS: Performed by: INTERNAL MEDICINE

## 2021-08-12 PROCEDURE — 3609008400 HC SIGMOIDOSCOPY DIAGNOSTIC: Performed by: SPECIALIST

## 2021-08-12 PROCEDURE — 2580000003 HC RX 258: Performed by: INTERNAL MEDICINE

## 2021-08-12 PROCEDURE — 83690 ASSAY OF LIPASE: CPT

## 2021-08-12 PROCEDURE — 85610 PROTHROMBIN TIME: CPT

## 2021-08-12 PROCEDURE — 0DJD8ZZ INSPECTION OF LOWER INTESTINAL TRACT, VIA NATURAL OR ARTIFICIAL OPENING ENDOSCOPIC: ICD-10-PCS | Performed by: SPECIALIST

## 2021-08-12 PROCEDURE — 99231 SBSQ HOSP IP/OBS SF/LOW 25: CPT | Performed by: SURGERY

## 2021-08-12 PROCEDURE — 3700000000 HC ANESTHESIA ATTENDED CARE: Performed by: SPECIALIST

## 2021-08-12 PROCEDURE — 80053 COMPREHEN METABOLIC PANEL: CPT

## 2021-08-12 PROCEDURE — 85730 THROMBOPLASTIN TIME PARTIAL: CPT

## 2021-08-12 PROCEDURE — 2140000000 HC CCU INTERMEDIATE R&B

## 2021-08-12 PROCEDURE — 36415 COLL VENOUS BLD VENIPUNCTURE: CPT

## 2021-08-12 PROCEDURE — 82150 ASSAY OF AMYLASE: CPT

## 2021-08-12 PROCEDURE — 80185 ASSAY OF PHENYTOIN TOTAL: CPT

## 2021-08-12 PROCEDURE — 3700000001 HC ADD 15 MINUTES (ANESTHESIA): Performed by: SPECIALIST

## 2021-08-12 PROCEDURE — 6360000002 HC RX W HCPCS: Performed by: NURSE PRACTITIONER

## 2021-08-12 PROCEDURE — 2709999900 HC NON-CHARGEABLE SUPPLY: Performed by: SPECIALIST

## 2021-08-12 RX ORDER — POTASSIUM CHLORIDE 29.8 MG/ML
40 INJECTION INTRAVENOUS ONCE
Status: DISCONTINUED | OUTPATIENT
Start: 2021-08-12 | End: 2021-08-12 | Stop reason: ALTCHOICE

## 2021-08-12 RX ORDER — POTASSIUM CHLORIDE 29.8 MG/ML
20 INJECTION INTRAVENOUS
Status: DISPENSED | OUTPATIENT
Start: 2021-08-12 | End: 2021-08-12

## 2021-08-12 RX ORDER — SIMETHICONE 20 MG/.3ML
EMULSION ORAL PRN
Status: DISCONTINUED | OUTPATIENT
Start: 2021-08-12 | End: 2021-08-12 | Stop reason: HOSPADM

## 2021-08-12 RX ORDER — PROPOFOL 10 MG/ML
INJECTION, EMULSION INTRAVENOUS PRN
Status: DISCONTINUED | OUTPATIENT
Start: 2021-08-12 | End: 2021-08-12 | Stop reason: SDUPTHER

## 2021-08-12 RX ORDER — LIDOCAINE HYDROCHLORIDE 20 MG/ML
INJECTION, SOLUTION INTRAVENOUS PRN
Status: DISCONTINUED | OUTPATIENT
Start: 2021-08-12 | End: 2021-08-12 | Stop reason: SDUPTHER

## 2021-08-12 RX ORDER — POTASSIUM CHLORIDE 7.45 MG/ML
40 INJECTION INTRAVENOUS ONCE
Status: COMPLETED | OUTPATIENT
Start: 2021-08-12 | End: 2021-08-12

## 2021-08-12 RX ORDER — SODIUM CHLORIDE 9 MG/ML
INJECTION, SOLUTION INTRAVENOUS CONTINUOUS PRN
Status: DISCONTINUED | OUTPATIENT
Start: 2021-08-12 | End: 2021-08-12 | Stop reason: SDUPTHER

## 2021-08-12 RX ADMIN — POTASSIUM CHLORIDE 40 MEQ: 7.46 INJECTION, SOLUTION INTRAVENOUS at 05:06

## 2021-08-12 RX ADMIN — FOSPHENYTOIN SODIUM 200 MG PE: 50 INJECTION, SOLUTION INTRAMUSCULAR; INTRAVENOUS at 20:24

## 2021-08-12 RX ADMIN — SODIUM CHLORIDE: 9 INJECTION, SOLUTION INTRAVENOUS at 10:26

## 2021-08-12 RX ADMIN — POTASSIUM CHLORIDE AND SODIUM CHLORIDE: 900; 150 INJECTION, SOLUTION INTRAVENOUS at 05:06

## 2021-08-12 RX ADMIN — PROPOFOL 60 MG: 10 INJECTION, EMULSION INTRAVENOUS at 10:28

## 2021-08-12 RX ADMIN — POTASSIUM CHLORIDE AND SODIUM CHLORIDE: 900; 150 INJECTION, SOLUTION INTRAVENOUS at 20:24

## 2021-08-12 RX ADMIN — CEFEPIME HYDROCHLORIDE 1000 MG: 1 INJECTION, POWDER, FOR SOLUTION INTRAMUSCULAR; INTRAVENOUS at 04:15

## 2021-08-12 RX ADMIN — LIDOCAINE HYDROCHLORIDE 80 MG: 20 INJECTION, SOLUTION INTRAVENOUS at 10:28

## 2021-08-12 RX ADMIN — MAGNESIUM CITRATE 296 ML: 1.75 LIQUID ORAL at 00:17

## 2021-08-12 RX ADMIN — CEFEPIME HYDROCHLORIDE 1000 MG: 1 INJECTION, POWDER, FOR SOLUTION INTRAMUSCULAR; INTRAVENOUS at 17:47

## 2021-08-12 RX ADMIN — PHENYLEPHRINE HYDROCHLORIDE 100 MCG: 10 INJECTION INTRAVENOUS at 10:34

## 2021-08-12 ASSESSMENT — PAIN SCALES - GENERAL
PAINLEVEL_OUTOF10: 0
PAINLEVEL_OUTOF10: 0

## 2021-08-12 ASSESSMENT — PAIN DESCRIPTION - ORIENTATION: ORIENTATION: RIGHT

## 2021-08-12 ASSESSMENT — PAIN DESCRIPTION - LOCATION: LOCATION: WRIST

## 2021-08-12 ASSESSMENT — PAIN SCALES - WONG BAKER: WONGBAKER_NUMERICALRESPONSE: 4

## 2021-08-12 NOTE — PROGRESS NOTES
GENERAL SURGERY PROGRESS NOTE    CC/HPI:           Patient feels OK tolerating liquid diet. .. and had a NEGATIVE flex sig today!!       Vitals:    08/11/21 2230 08/11/21 2356 08/12/21 0437 08/12/21 0500   BP:  116/82 114/83 107/78   Pulse:  87 88    Resp:  16 17    Temp: 97.5 °F (36.4 °C) 97.7 °F (36.5 °C) 97.5 °F (36.4 °C)    TempSrc:  Oral Oral    SpO2:  96% 93%    Weight:   138 lb 7.2 oz (62.8 kg)    Height:         No intake/output data recorded. No intake/output data recorded.     Diet NPO Exceptions are: Ice Chips    Recent Results (from the past 48 hour(s))   CBC Auto Differential    Collection Time: 08/11/21  4:41 AM   Result Value Ref Range    WBC 10.5 4.0 - 10.5 K/CU MM    RBC 4.79 4.6 - 6.2 M/CU MM    Hemoglobin 14.1 13.5 - 18.0 GM/DL    Hematocrit 45.1 42 - 52 %    MCV 94.2 78 - 100 FL    MCH 29.4 27 - 31 PG    MCHC 31.3 (L) 32.0 - 36.0 %    RDW 14.6 11.7 - 14.9 %    Platelets 990 627 - 902 K/CU MM    MPV 10.1 7.5 - 11.1 FL    Differential Type AUTOMATED DIFFERENTIAL     Segs Relative 69.2 (H) 36 - 66 %    Lymphocytes % 20.3 (L) 24 - 44 %    Monocytes % 6.9 (H) 0 - 4 %    Eosinophils % 2.6 0 - 3 %    Basophils % 0.5 0 - 1 %    Segs Absolute 7.3 K/CU MM    Lymphocytes Absolute 2.1 K/CU MM    Monocytes Absolute 0.7 K/CU MM    Eosinophils Absolute 0.3 K/CU MM    Basophils Absolute 0.1 K/CU MM    Nucleated RBC % 0.0 %    Total Nucleated RBC 0.0 K/CU MM    Total Immature Neutrophil 0.05 K/CU MM    Immature Neutrophil % 0.5 (H) 0 - 0.43 %   Comprehensive Metabolic Panel    Collection Time: 08/11/21  4:41 AM   Result Value Ref Range    Sodium 138 135 - 145 MMOL/L    Potassium 3.0 (LL) 3.5 - 5.1 MMOL/L    Chloride 104 99 - 110 mMol/L    CO2 24 21 - 32 MMOL/L    BUN 6 6 - 23 MG/DL    CREATININE 0.4 (L) 0.9 - 1.3 MG/DL    Glucose 90 70 - 99 MG/DL    Calcium 8.0 (L) 8.3 - 10.6 MG/DL    Albumin 3.0 (L) 3.4 - 5.0 GM/DL    Total Protein 5.4 (L) 6.4 - 8.2 GM/DL    Total Bilirubin 0.2 0.0 - 1.0 MG/DL    ALT <5 (L) 10 - 40 U/L    AST 12 (L) 15 - 37 IU/L    Alkaline Phosphatase 150 (H) 40 - 128 IU/L    GFR Non-African American >60 >60 mL/min/1.73m2    GFR African American >60 >60 mL/min/1.73m2    Anion Gap 10 4 - 16   Protime/INR & PTT    Collection Time: 08/11/21  4:41 AM   Result Value Ref Range    Protime 18.5 (H) 11.7 - 14.5 SECONDS    INR 1.43 INDEX    aPTT 26.0 25.1 - 37.1 SECONDS   Vancomycin, trough    Collection Time: 08/11/21  4:41 AM   Result Value Ref Range    Vancomycin Tr 8.4 (L) 10 - 20 UG/ML    DOSE AMOUNT DOSE AMT. GIVEN - 1000 mg     DOSE TIME DOSE TIME GIVEN - 0700    Magnesium    Collection Time: 08/11/21  4:41 AM   Result Value Ref Range    Magnesium 1.6 (L) 1.8 - 2.4 mg/dl   COVID-19, Rapid    Collection Time: 08/11/21  1:15 PM    Specimen: Nasopharyngeal   Result Value Ref Range    Source UNKNOWN     SARS-CoV-2, NAAT NOT DETECTED NOT DETECTED   Brain Natriuretic Peptide    Collection Time: 08/12/21  1:20 AM   Result Value Ref Range    Pro-.8 <300 PG/ML   Phenytoin level, total    Collection Time: 08/12/21  1:45 AM   Result Value Ref Range    Phenytoin Lvl 21.9 (H) 10 - 20 UG/ML    DOSE AMOUNT DOSE AMT.  GIVEN - 200     DOSE TIME DOSE TIME GIVEN - 0800    Amylase    Collection Time: 08/12/21  1:45 AM   Result Value Ref Range    Amylase 55 25 - 115 U/L   CBC Auto Differential    Collection Time: 08/12/21  1:45 AM   Result Value Ref Range    WBC 12.4 (H) 4.0 - 10.5 K/CU MM    RBC 4.87 4.6 - 6.2 M/CU MM    Hemoglobin 14.4 13.5 - 18.0 GM/DL    Hematocrit 44.4 42 - 52 %    MCV 91.2 78 - 100 FL    MCH 29.6 27 - 31 PG    MCHC 32.4 32.0 - 36.0 %    RDW 14.6 11.7 - 14.9 %    Platelets 589 554 - 341 K/CU MM    MPV 10.3 7.5 - 11.1 FL    Differential Type AUTOMATED DIFFERENTIAL     Segs Relative 82.2 (H) 36 - 66 %    Lymphocytes % 10.4 (L) 24 - 44 %    Monocytes % 5.4 (H) 0 - 4 %    Eosinophils % 0.9 0 - 3 %    Basophils % 0.3 0 - 1 %    Segs Absolute 10.2 K/CU MM    Lymphocytes Absolute 1.3 K/CU MM Monocytes Absolute 0.7 K/CU MM    Eosinophils Absolute 0.1 K/CU MM    Basophils Absolute 0.0 K/CU MM    Nucleated RBC % 0.0 %    Total Nucleated RBC 0.0 K/CU MM    Total Immature Neutrophil 0.10 K/CU MM    Immature Neutrophil % 0.8 (H) 0 - 0.43 %   Comprehensive Metabolic Panel    Collection Time: 08/12/21  1:45 AM   Result Value Ref Range    Sodium 138 135 - 145 MMOL/L    Potassium 3.1 (L) 3.5 - 5.1 MMOL/L    Chloride 102 99 - 110 mMol/L    CO2 26 21 - 32 MMOL/L    BUN 5 (L) 6 - 23 MG/DL    CREATININE 0.5 (L) 0.9 - 1.3 MG/DL    Glucose 108 (H) 70 - 99 MG/DL    Calcium 8.2 (L) 8.3 - 10.6 MG/DL    Albumin 3.3 (L) 3.4 - 5.0 GM/DL    Total Protein 5.8 (L) 6.4 - 8.2 GM/DL    Total Bilirubin 0.2 0.0 - 1.0 MG/DL    ALT 7 (L) 10 - 40 U/L    AST 13 (L) 15 - 37 IU/L    Alkaline Phosphatase 162 (H) 40 - 128 IU/L    GFR Non-African American >60 >60 mL/min/1.73m2    GFR African American >60 >60 mL/min/1.73m2    Anion Gap 10 4 - 16   Lipase    Collection Time: 08/12/21  1:45 AM   Result Value Ref Range    Lipase 34 13 - 60 IU/L   Protime/INR & PTT    Collection Time: 08/12/21  1:45 AM   Result Value Ref Range    Protime 18.2 (H) 11.7 - 14.5 SECONDS    INR 1.41 INDEX    aPTT 26.1 25.1 - 37.1 SECONDS       Scheduled Meds:   sodium chloride flush  5-40 mL Intravenous BID    sodium chloride flush  5-40 mL Intravenous 2 times per day    sodium chloride flush  5-40 mL Intravenous 2 times per day    cefepime  1,000 mg Intravenous Q12H    midodrine  10 mg Oral TID WC    fosphenytoin  200 mg PE Intravenous Q12H       Continuous Infusions:   sodium chloride      0.9% NaCl with KCl 20 mEq 100 mL/hr at 08/12/21 0506       Physical Exam:  HEENT: Anicteric sclerae, Oropharyngeal mucosae moist, pink and intact. Heart:  Normal S1 and S2, RRR  Lungs: Clear to auscultation bilaterally, No audible Wheezes or Rales. Extremities: No edema.   Neuro: Awake comfortable, Non focal.  Abdomen: Soft, Benign, Non tender, Non distended, Positive bowel sounds. Active Problems:    Large bowel obstruction (HCC)    Severe malnutrition (HCC)  Resolved Problems:    * No resolved hospital problems. *      Assessment and Plan:  Ying Gonzales is a 80 y.o. male with constipation, a CT revealing a large bowel obstruction, and questioning a rectal obstructing mass. . D/W Dr Leonel Arellano, and he flex sig-ed him TODAY:  \"POSTOPERATIVE DIAGNOSES:  1.  Markedly dilated redundant colon, full of stool. 2.  No evidence of rectal mass or neoplasm. 3.  Mixed hemorrhoids.     RECOMMENDATIONS:  1. We will continue present management. 2.  The patient agreeable will need a followup colonoscopy later as an  outpatient after thorough prepping the colon. 3.  We will discuss the case with the patient's daughter as well. \"     I discussed with his daughter 3 days ago, and she wants NO MAJOR surgical interventions. .. and NONE is needed!    ___________________________________________    Ashli Sparks MD, FACS, FICS  8/12/2021  6:37 AM

## 2021-08-12 NOTE — OP NOTE
[address] [place] [address], [address] [place] [address]                                OPERATIVE REPORT    PATIENT NAME: Blanquita Johnson                   :        1932  MED REC NO:   [id number]                          ROOM:       5537  ACCOUNT NO:   [de-identified]                           ADMIT DATE: 2021  PROVIDER:     Jon Verdugo MD    DATE OF PROCEDURE:  2021    The patient is an inpatient. PROCEDURE:  Flexible sigmoidoscopy. SURGEON:  Jon Verdugo MD    CHIEF COMPLAINT:  Abnormal CAT scan, rule out rectal neoplasm/intestinal  obstruction. PREMEDICATION:  Please refer to the anesthesiologist's notes. BLOOD LOSS DURING THE PROCEDURE:  Nil. DETAILS OF THE PROCEDURE:  The patient was placed in the left lateral  decubitus position and a rectal examination was performed. Rectal examination:  Rectal examination revealed evidence of external  hemorrhoids, but no fissures or fistulas were seen and the rectal mucosa  felt was unremarkable. The video Olympus colonoscope was subsequently reduced into the rectum,  but could be advanced only up to 60 cm. The rectum and the sigmoid  colon up to this point was markedly dilated, redundant, and full of  solid stool. No mass lesions were seen in the rectum. The colonoscope was retroflexed in the rectum and the anorectal junction  was carefully examined and grade 1 or 2 internal hemorrhoids were seen. POSTOPERATIVE DIAGNOSES:  1.  Markedly dilated redundant colon, full of stool. 2.  No evidence of rectal mass or neoplasm. 3.  Mixed hemorrhoids. RECOMMENDATIONS:  1. We will continue present management. 2.  The patient agreeable will need a followup colonoscopy later as an  outpatient after thorough prepping the colon. 3.  We will discuss the case with the patient's daughter as well. The patient tolerated the procedure well.   There were no

## 2021-08-12 NOTE — ANESTHESIA POSTPROCEDURE EVALUATION
Department of Anesthesiology  Postprocedure Note    Patient: Dread Ames  MRN: [id number]  YOB: 1932  Date of evaluation: 8/12/2021  Time:  10:54 AM     Procedure Summary     Date: 08/12/21 Room / Location: [address] [place] [address]    Anesthesia Start: 1058 Anesthesia Stop: 1054    Procedure: SIGMOIDOSCOPY DIAGNOSTIC FLEXIBLE (N/A ) Diagnosis: (abn cat scan)    Surgeons: Jan Zaidi MD Responsible Provider: Babatunde Díaz MD    Anesthesia Type: MAC ASA Status: 4          Anesthesia Type: MAC    David Phase I:  10    David Phase II:  10    Last vitals: Reviewed and per EMR flowsheets.        Anesthesia Post Evaluation    Patient location during evaluation: bedside  Patient participation: complete - patient participated  Level of consciousness: awake and alert  Pain score: 0  Airway patency: patent  Nausea & Vomiting: no nausea and no vomiting  Complications: no  Cardiovascular status: hemodynamically stable  Respiratory status: acceptable, room air, spontaneous ventilation and nonlabored ventilation  Hydration status: euvolemic

## 2021-08-12 NOTE — BRIEF OP NOTE
Brief Postoperative Note      Malia Coffey is a 80 y.o. male     Pre-operative Diagnosis:ABNORMAL CT SCAN    Post-operative Diagnosis: MARKEDLY DILATED REDUNDANT COLON FULL OF STOOL --NO RECTAL MASS-    Procedure: F/ SIGMOIDOSCOPY UPTO 60 CM    Anesthesia: MAC    Surgeons/Assistants:Ronn Knight MD     Estimated Blood Loss: NONE    Complications: None    Specimens: were not obtained      Barbara Kyle MD   8/12/2021   10:44 AM

## 2021-08-12 NOTE — ANESTHESIA PRE PROCEDURE
Department of Anesthesiology  Preprocedure Note       Name:  Cristina Nieves   Age:  80 y.o.  :  1932                                          MRN:  [id number]         Date:  2021      Surgeon: Mayela Martino):  Valarie Hendrix MD    Procedure: Procedure(s):  SIGMOIDOSCOPY DIAGNOSTIC FLEXIBLE    Medications prior to admission:   Prior to Admission medications    Medication Sig Start Date End Date Taking? Authorizing Provider   Cholecalciferol (VITAMIN D3) 125 MCG (5000 UT) TABS Take 1 tablet by mouth daily   Yes Historical Provider, MD   ondansetron (ZOFRAN) 4 MG tablet Take 4 mg by mouth every 8 hours as needed for Nausea or Vomiting   Yes Historical Provider, MD   midodrine (PROAMATINE) 5 MG tablet Take 10 mg by mouth 3 times daily   Yes Historical Provider, MD   docusate sodium (COLACE) 100 MG capsule Take 200 mg by mouth nightly   Yes Historical Provider, MD   sertraline (ZOLOFT) 25 MG tablet Take 25 mg by mouth daily   Yes Historical Provider, MD   polyethylene glycol (MIRALAX) 17 g PACK packet Take 17 g by mouth 2 times daily   Yes Historical Provider, MD   senna-docusate (Yetta Price) 8.6-50 MG per tablet Take 1 tablet by mouth every 12 hours as needed for Constipation   Yes Historical Provider, MD   atorvastatin (LIPITOR) 40 MG tablet Take 40 mg by mouth nightly   Yes Historical Provider, MD   apixaban (ELIQUIS) 5 MG TABS tablet Take by mouth 2 times daily   Yes Historical Provider, MD   mirtazapine (REMERON) 15 MG tablet Take 15 mg by mouth nightly   Yes Historical Provider, MD   phenytoin (DILANTIN) 100 MG ER capsule Take 200 mg by mouth 2 times daily    Yes Historical Provider, MD   potassium chloride (KLOR-CON M) 20 MEQ TBCR extended release tablet Take 40 mEq by mouth daily (with breakfast)   Yes Historical Provider, MD   nitroGLYCERIN (NITROSTAT) 0.4 MG SL tablet Place 0.4 mg under the tongue every 5 minutes as needed for Chest pain up to max of 3 total doses. If no relief after 1 dose, call 911. Yes Historical Provider, MD   magnesium hydroxide (MILK OF MAGNESIA) 400 MG/5ML suspension Take 30 mLs by mouth daily as needed for Constipation   Yes Historical Provider, MD   acetaminophen (TYLENOL) 500 MG tablet Take 500 mg by mouth every 6 hours as needed for Pain   Yes Historical Provider, MD   bisacodyl (DULCOLAX) 10 MG suppository Place 10 mg rectally as needed for Constipation (if day 4 with no BM)   Yes Historical Provider, MD       Current medications:    Current Facility-Administered Medications   Medication Dose Route Frequency Provider Last Rate Last Admin    potassium chloride 20 mEq/50 mL IVPB (Central Line)  20 mEq Intravenous Q2H Filippo Key MD        albuterol (PROVENTIL) nebulizer solution 2.5 mg  2.5 mg Nebulization Q6H PRN ELIZABET Rao - CNP   2.5 mg at 08/11/21 2325    sodium chloride flush 0.9 % injection 5-40 mL  5-40 mL Intravenous BID Ava Solomon MD   10 mL at 08/11/21 1142    sodium chloride flush 0.9 % injection 5-40 mL  5-40 mL Intravenous 2 times per day Ava Solomon MD   10 mL at 08/11/21 1141    sodium chloride flush 0.9 % injection 5-40 mL  5-40 mL Intravenous PRN Ava Solomon MD        sodium chloride flush 0.9 % injection 5-40 mL  5-40 mL Intravenous 2 times per day Han Camarena MD   10 mL at 08/11/21 1141    sodium chloride flush 0.9 % injection 5-40 mL  5-40 mL Intravenous PRN Han Camarena MD        0.9 % sodium chloride infusion  25 mL Intravenous PRN Han Camarena MD        ondansetron (ZOFRAN-ODT) disintegrating tablet 4 mg  4 mg Oral Q8H PRN Han Camarena MD        Or    ondansetron (ZOFRAN) injection 4 mg  4 mg Intravenous Q6H PRN Han Camarena MD        polyethylene glycol (GLYCOLAX) packet 17 g  17 g Oral Daily PRN Han Camarena MD        acetaminophen (TYLENOL) tablet 650 mg  650 mg Oral Q6H PRN Han Camarena MD        Or    acetaminophen (TYLENOL) suppository 650 mg  650 mg Rectal Q6H PRN Rachel Pagan MD        0.9% NaCl with KCl 20 mEq infusion   Intravenous Continuous Rachel Pagan  mL/hr at 08/12/21 0506 New Bag at 08/12/21 0506    cefepime (MAXIPIME) 1000 mg IVPB minibag  1,000 mg Intravenous Q12H Rachel Pagan MD   Stopped at 08/12/21 0444    midodrine (PROAMATINE) tablet 10 mg  10 mg Oral TID WC Rachel Pagan MD   10 mg at 08/11/21 1652    fosphenytoin (CEREBYX) 200 mg PE in dextrose 5 % 50 mL IVPB (maintenance dose)  200 mg PE Intravenous Q12H Rachel Pagan  mL/hr at 08/11/21 2242 200 mg PE at 08/11/21 2242       Allergies: Allergies   Allergen Reactions    Nabumetone Other (See Comments)     unknown    Oxycodone Other (See Comments)     unknown       Problem List:    Patient Active Problem List   Diagnosis Code    Large bowel obstruction ([facility] K56.609    Severe malnutrition ([facility] E43       Past Medical History:        Diagnosis Date    Alzheimer disease ([facility]     AMS (altered mental status)     Arthritis     CAD (coronary artery disease)     CHF (congestive heart failure) (HCC)     Depression     Difficulty walking     Encephalopathy     Gastroenteritis     Hyperlipidemia     Hypertension     Hypoxemia     Ileus (HCC)     Malnutrition (HCC)     Muscle weakness     Osteoporosis     Seizures (HCC)     TIA (transient ischemic attack)        Past Surgical History:  No past surgical history on file.     Social History:    Social History     Tobacco Use    Smoking status: Not on file   Substance Use Topics    Alcohol use: Not on file                                Counseling given: Not Answered      Vital Signs (Current):   Vitals:    08/12/21 0437 08/12/21 0500 08/12/21 0600 08/12/21 0947   BP: 114/83 107/78 (!) 84/46 122/71   Pulse: 88   90   Resp: 17   18   Temp: 36.4 °C (97.5 °F)   36.6 °C (97.8 °F)   TempSrc: Oral   Temporal   SpO2: 93%   93%   Weight: 138 lb 7.2 oz (62.8 kg)      Height: edentulous      Pulmonary:normal exam                               Cardiovascular:  Exercise tolerance: poor (<4 METS),   (+) hypertension: severe, CAD:, CHF:, SONI:, hyperlipidemia         Beta Blocker:  Not on Beta Blocker      ROS comment: Left ventricular systolic function is normal.   Ejection fraction is visually estimated at 50-55%. Individual aortic valve leaflets are not clearly visualized. The aortic valve is heavily calcified; degree of stenosis cannot be   determined due to poor acoustical windows. Tricuspid valve is not well visualized, vegetation cannot be ruled out. No evidence of any pericardial effusion     Neuro/Psych:   (+) seizures:, neuromuscular disease:, TIA, psychiatric history:depression/anxiety              ROS comment: Alzheimer disease GI/Hepatic/Renal: Neg GI/Hepatic/Renal ROS            Endo/Other: Negative Endo/Other ROS                    Abdominal:             Vascular: negative vascular ROS. Other Findings:           Anesthesia Plan      MAC     ASA 4       Induction: intravenous. Anesthetic plan and risks discussed with patient, healthcare power of  and child/children. ELIZABET Garrison CRNA   8/12/2021      Pre Anesthesia Evaluation complete. Anesthesia plan, risks, benefits, alternatives, and personnel discussed with patient and/or legal guardian. Patient and/or legal guardian verbalized an understanding and agreed to proceed. Anesthesia plan discussed with care team members and agreed upon.   ELIZABET Garrison CRNA  8/12/2021

## 2021-08-12 NOTE — PROGRESS NOTES
Hospital Medicine: Progress Note     Melba Carranza  7/20/1932         Admit Date: 8/8/2021   Primary Care Physician:  No primary care provider on file. BP (!) 84/46   Pulse 88   Temp 97.5 °F (36.4 °C) (Oral)   Resp 17   Ht 5' 9\" (1.753 m)   Wt 138 lb 7.2 oz (62.8 kg)   SpO2 93%   BMI 20.45 kg/m²     Chief Complaint:    Abdominal pain, nausea and vomiting. Perpetual history:   25-year-old male who presented from [facility] with N/V/D. He was unable to provide information. He was pointing to the epigastric region and complaining of pain. CXR showed markedly distended air-filled loops of bowel within the upper abdomen, contrast CT of the abdomen and pelvis shows circumferential thickening of the rectum concerning for neoplasm. He was admitted for further evaluation. Interval History:    He was seen and examined at bedside this morning, resting in bed, awake and alert, follows commands, in no acute distress. He is oriented to place and person but not situation. Nursing reported some nausea with trial of GoLYTELY for prep. Colonoscopy is planned for today. Impression/Plan:    #Nausea and vomiting:  -Probably related to bowel obstruction.  -Nausea resolved but again with bowel prep.  -Antiemetics as needed.  -Pending colonoscopy today. #Concern for infectious colitis:  -CT also with concern for terminal GI neoplasm.  -Cultures negative thus far. -Reassess antibiotics after colonoscopy. #SIRS:  -Suspect GI infection.  -Unremarkable CXR. -He is afebrile with stable hemodynamics. #Elevated troponin:  -No CP, pressure. -EKG with no acute ischemic changes.  -Reviewed 2D echocardiogram, EF of 50 to 55%. -Normal LV systolic function.  -Evaluated by cardiology, no further work-up at this time. #Chronic type B dissection:  -Reviewed CT of the abdomen pelvis.  -No acute intervention at this time.  -Outpatient CT surgery follow-up.     #Hypokalemia:  -Replacing potassium and other deficits. Disposition plan:   Await colonoscopy result, ultimate discharge plan back to his long-term care unit. Physical exam:  General appearance: Elderly male, resting in bed, interactive, no acute distress. Head/EENT: ncat, perrl, eomi, dmm. Neck: supple, no JVD. Chest: symmetric with equal expansion, no retractions. Lungs:clear to auscultation bilaterally, no egophony. Heart: normal s1s2, no added sounds. Abdomen: Distended and hyperresonant, positive bowel sounds, mild discomfort on palpation. Extremities: Moves all extremities: No C/E  Pulses: palpable and strong bilaterally. Skin: warm and dry. Neurologic: no focal deficits. Data Review:     CBC:   Recent Labs     08/10/21  0631 08/11/21  0441 08/12/21  0145   WBC 10.5 10.5 12.4*   HGB 13.3* 14.1 14.4    219 247     [place]. Panel:    Recent Labs     08/10/21  0631 08/11/21  0441 08/12/21  0145    138 138   K 3.0* 3.0* 3.1*    104 102   CO2 24 24 26   BUN 9 6 5*   CREATININE 0.6* 0.4* 0.5*   GLUCOSE 83 90 108*     Hepatic:   Recent Labs     08/10/21  0631 08/11/21  0441 08/12/21  0145   AST 11* 12* 13*   ALT 6* <5* 7*   BILITOT 0.1 0.2 0.2   ALKPHOS 140* 150* 162*     Meds:    sodium chloride flush  5-40 mL Intravenous BID    sodium chloride flush  5-40 mL Intravenous 2 times per day    sodium chloride flush  5-40 mL Intravenous 2 times per day    cefepime  1,000 mg Intravenous Q12H    midodrine  10 mg Oral TID WC    fosphenytoin  200 mg PE Intravenous Q12H    [START ON 8/10/2021] vancomycin  1,000 mg Intravenous Q24H     Rachel Richmond MD    Note: Computer voice recognition system was used in parts of this documentation. There is a possibility of sound alike word errors inherent to this technology that may be missed during proof-reading and may alter the context of this note. Please notify the Raul Dunlap of material inconsistencies.

## 2021-08-12 NOTE — PROGRESS NOTES
Pt having audible expiratory wheezing notified NP to see if pt can have breathing tx.    After breathing tx pt still having ronchi sounds in lung with history of CHF, called np to ask for chest xray or check probnp levels due to iv fluid intake throughout today

## 2021-08-13 VITALS
TEMPERATURE: 98 F | BODY MASS INDEX: 20.51 KG/M2 | OXYGEN SATURATION: 98 % | WEIGHT: 138.45 LBS | HEIGHT: 69 IN | RESPIRATION RATE: 16 BRPM | HEART RATE: 81 BPM | DIASTOLIC BLOOD PRESSURE: 63 MMHG | SYSTOLIC BLOOD PRESSURE: 127 MMHG

## 2021-08-13 PROCEDURE — 6360000002 HC RX W HCPCS: Performed by: INTERNAL MEDICINE

## 2021-08-13 PROCEDURE — 6370000000 HC RX 637 (ALT 250 FOR IP): Performed by: NURSE PRACTITIONER

## 2021-08-13 PROCEDURE — 6370000000 HC RX 637 (ALT 250 FOR IP): Performed by: INTERNAL MEDICINE

## 2021-08-13 PROCEDURE — 99231 SBSQ HOSP IP/OBS SF/LOW 25: CPT | Performed by: SURGERY

## 2021-08-13 PROCEDURE — 2580000003 HC RX 258: Performed by: INTERNAL MEDICINE

## 2021-08-13 PROCEDURE — 94761 N-INVAS EAR/PLS OXIMETRY MLT: CPT

## 2021-08-13 RX ORDER — MIDODRINE HYDROCHLORIDE 5 MG/1
10 TABLET ORAL 3 TIMES DAILY
COMMUNITY

## 2021-08-13 RX ORDER — PHENYTOIN SODIUM 100 MG/1
200 CAPSULE, EXTENDED RELEASE ORAL 2 TIMES DAILY
COMMUNITY

## 2021-08-13 RX ORDER — POTASSIUM CHLORIDE 20 MEQ/1
40 TABLET, EXTENDED RELEASE ORAL PRN
Status: DISCONTINUED | OUTPATIENT
Start: 2021-08-13 | End: 2021-08-13 | Stop reason: HOSPADM

## 2021-08-13 RX ORDER — POTASSIUM CHLORIDE 7.45 MG/ML
10 INJECTION INTRAVENOUS PRN
Status: DISCONTINUED | OUTPATIENT
Start: 2021-08-13 | End: 2021-08-13 | Stop reason: HOSPADM

## 2021-08-13 RX ORDER — MIDODRINE HYDROCHLORIDE 5 MG/1
10 TABLET ORAL 3 TIMES DAILY
Qty: 90 TABLET | Refills: 3
Start: 2021-08-13 | End: 2021-08-13 | Stop reason: HOSPADM

## 2021-08-13 RX ORDER — POTASSIUM CHLORIDE 20 MEQ/1
20 TABLET, EXTENDED RELEASE ORAL ONCE
Status: COMPLETED | OUTPATIENT
Start: 2021-08-13 | End: 2021-08-13

## 2021-08-13 RX ADMIN — MIDODRINE HYDROCHLORIDE 10 MG: 5 TABLET ORAL at 08:54

## 2021-08-13 RX ADMIN — POTASSIUM CHLORIDE 10 MEQ: 7.46 INJECTION, SOLUTION INTRAVENOUS at 18:07

## 2021-08-13 RX ADMIN — POTASSIUM CHLORIDE 10 MEQ: 7.46 INJECTION, SOLUTION INTRAVENOUS at 13:57

## 2021-08-13 RX ADMIN — POTASSIUM CHLORIDE 10 MEQ: 7.46 INJECTION, SOLUTION INTRAVENOUS at 16:59

## 2021-08-13 RX ADMIN — SODIUM CHLORIDE, PRESERVATIVE FREE 10 ML: 5 INJECTION INTRAVENOUS at 08:54

## 2021-08-13 RX ADMIN — CEFEPIME HYDROCHLORIDE 1000 MG: 1 INJECTION, POWDER, FOR SOLUTION INTRAMUSCULAR; INTRAVENOUS at 16:02

## 2021-08-13 RX ADMIN — CEFEPIME HYDROCHLORIDE 1000 MG: 1 INJECTION, POWDER, FOR SOLUTION INTRAMUSCULAR; INTRAVENOUS at 05:20

## 2021-08-13 RX ADMIN — POTASSIUM CHLORIDE 10 MEQ: 7.46 INJECTION, SOLUTION INTRAVENOUS at 16:00

## 2021-08-13 RX ADMIN — FOSPHENYTOIN SODIUM 200 MG PE: 50 INJECTION, SOLUTION INTRAMUSCULAR; INTRAVENOUS at 08:54

## 2021-08-13 RX ADMIN — POTASSIUM CHLORIDE 10 MEQ: 7.46 INJECTION, SOLUTION INTRAVENOUS at 14:57

## 2021-08-13 RX ADMIN — MIDODRINE HYDROCHLORIDE 10 MG: 5 TABLET ORAL at 16:02

## 2021-08-13 RX ADMIN — MIDODRINE HYDROCHLORIDE 10 MG: 5 TABLET ORAL at 12:09

## 2021-08-13 RX ADMIN — POTASSIUM CHLORIDE 20 MEQ: 1500 TABLET, EXTENDED RELEASE ORAL at 19:48

## 2021-08-13 RX ADMIN — POTASSIUM CHLORIDE AND SODIUM CHLORIDE: 900; 150 INJECTION, SOLUTION INTRAVENOUS at 09:38

## 2021-08-13 ASSESSMENT — PAIN SCALES - WONG BAKER
WONGBAKER_NUMERICALRESPONSE: 0

## 2021-08-13 ASSESSMENT — PAIN SCALES - GENERAL
PAINLEVEL_OUTOF10: 0

## 2021-08-13 NOTE — PROGRESS NOTES
Nucleated RBC % 0.0 %    Total Nucleated RBC 0.0 K/CU MM    Total Immature Neutrophil 0.10 K/CU MM    Immature Neutrophil % 0.8 (H) 0 - 0.43 %   Comprehensive Metabolic Panel    Collection Time: 08/12/21  1:45 AM   Result Value Ref Range    Sodium 138 135 - 145 MMOL/L    Potassium 3.1 (L) 3.5 - 5.1 MMOL/L    Chloride 102 99 - 110 mMol/L    CO2 26 21 - 32 MMOL/L    BUN 5 (L) 6 - 23 MG/DL    CREATININE 0.5 (L) 0.9 - 1.3 MG/DL    Glucose 108 (H) 70 - 99 MG/DL    Calcium 8.2 (L) 8.3 - 10.6 MG/DL    Albumin 3.3 (L) 3.4 - 5.0 GM/DL    Total Protein 5.8 (L) 6.4 - 8.2 GM/DL    Total Bilirubin 0.2 0.0 - 1.0 MG/DL    ALT 7 (L) 10 - 40 U/L    AST 13 (L) 15 - 37 IU/L    Alkaline Phosphatase 162 (H) 40 - 128 IU/L    GFR Non-African American >60 >60 mL/min/1.73m2    GFR African American >60 >60 mL/min/1.73m2    Anion Gap 10 4 - 16   Lipase    Collection Time: 08/12/21  1:45 AM   Result Value Ref Range    Lipase 34 13 - 60 IU/L   Protime/INR & PTT    Collection Time: 08/12/21  1:45 AM   Result Value Ref Range    Protime 18.2 (H) 11.7 - 14.5 SECONDS    INR 1.41 INDEX    aPTT 26.1 25.1 - 37.1 SECONDS       Scheduled Meds:   sodium chloride flush  5-40 mL Intravenous BID    sodium chloride flush  5-40 mL Intravenous 2 times per day    sodium chloride flush  5-40 mL Intravenous 2 times per day    cefepime  1,000 mg Intravenous Q12H    midodrine  10 mg Oral TID WC    fosphenytoin  200 mg PE Intravenous Q12H       Continuous Infusions:   sodium chloride      0.9% NaCl with KCl 20 mEq 100 mL/hr at 08/12/21 2024       Physical Exam:  HEENT: Anicteric sclerae, Oropharyngeal mucosae moist, pink and intact. Heart:  Normal S1 and S2, RRR  Lungs: Clear to auscultation bilaterally, No audible Wheezes or Rales. Extremities: No edema. Neuro: Awake comfortable, Non focal.  Abdomen: Soft, Benign, Non tender, Non distended, Positive bowel sounds.       Active Problems:    Large bowel obstruction (HCC)    Severe malnutrition ([address]  Resolved Problems:    * No resolved hospital problems. *      Assessment and Plan:  Gerber Lamas is a 80 y.o. male with constipation, a CT revealing a large bowel obstruction, and questioning a rectal obstructing mass. . D/W Dr Marli Johns, and he flex sig-ed him TODAY:  \"POSTOPERATIVE DIAGNOSES:  1.  Markedly dilated redundant colon, full of stool. 2.  No evidence of rectal mass or neoplasm. 3.  Mixed hemorrhoids.     RECOMMENDATIONS:  1. We will continue present management. 2.  The patient agreeable will need a followup colonoscopy later as an  outpatient after thorough prepping the colon. 3.  We will discuss the case with the patient's daughter as well. \"     I discussed with his daughter 4 days ago, and she wants NO MAJOR surgical interventions. .. and NONE is needed! NEGATIVE sigmoidoscopy per Bridger Sargent yesterday, advanced diet, and might be discharged.     ___________________________________________    Arlene Zaragoza MD, FACS, FICS  8/13/2021  4:46 AM

## 2021-08-13 NOTE — PROGRESS NOTES
Physician Progress Note      PATIENT:               Krystal White  CSN #:                  [id number]  :                       1932  ADMIT DATE:       2021 11:56 PM  [address] [place] DATE:  RESPONDING  PROVIDER #:        Darrick Wright MD          QUERY TEXT:    Pt admitted with large bowel obstruction. Pt noted to have lactic acidosis,   tachycardia, hypotension and leukocytosis . If possible, please document in   the progress notes and discharge summary if you are evaluating and /or   treating any of the following: The medical record reflects the following:  Risk Factors: possible bowel infection, age  Clinical Indicators: Dr. Katherine Blancas noted in  progress note \"SIRS: -Suspect GI   infection. \" T 97.5 - 99.8, P 77 - 98, R 14 - 27,  SBP 87 - 139, lactic acid   2.7 - 1.7, procal 0.042, WBC 12.6 - 10.5, Blood Cx x 2 - no growth at 48   hours. Treatment: IV cefepime and vanc, 1500 ml 0.9% NS bolus, lactic acid, blood Cx,   serial CBC, procal.    Thank you,  GALILEO SolorzanoN, RN, CDS  [phone]  Options provided:  -- Sepsis, present on admission  -- Sepsis was ruled out  -- Other - I will add my own diagnosis  -- Disagree - Not applicable / Not valid  -- Disagree - Clinically unable to determine / Unknown  -- Refer to Clinical Documentation Reviewer    PROVIDER RESPONSE TEXT:    He may have had sepsis, POA, there is a possibility that he had bacterial   translocation into the bloodstream through colon mucosa. See below for endoscopy report. POSTOPERATIVE DIAGNOSES: 1. Markedly dilated redundant colon, full of stool. 2. No evidence of rectal mass or neoplasm. 3. Mixed hemorrhoids. ?   RECOMMENDATIONS: 1. We will continue present management. 2. The patient   agreeable will need a followup colonoscopy later as an outpatient after   thorough prepping the colon. 3. We will discuss the case with the patient's   daughter as well. ? The patient tolerated the procedure well. There were no   postop complications.  ? ? ? ? ? ? Dennis Boyd MD ? D: 08/12/2021 10:49:13          Query created by: Charlie Dalton on 8/11/2021 12:37 PM      QUERY TEXT:    Pt admitted with large bowel obstruction and has malnutrition documented. Please further specify type of malnutrition with documentation in the medical   record. The medical record reflects the following:  Risk Factors: Age, CHF, dementia  Clinical Indicators: Dietitian noted in 8/11 consult note \"Severe   malnutrition, In context of acute illness or injury related to inadequate   protein-energy intake as evidenced by NPO or clear liquid status due to   medical condition, poor intake prior to admission, moderate loss of   subcutaneous fat, moderate muscle loss\", BMI 19.9, weight 61 kg, height 175.3   cm. Treatment: Dietitian consult, oral nutrition supplement    ASPEN Criteria:    https://aspenjournals. onlinelibrary. henry. com/doi/full/10.1177/526670934697054  5    Thank you,  RUPINDER Esparza, RN, [facility]  [phone]  Options provided:  -- Severe Protein calorie malnutrition  -- Severe Malnutrition  -- Other - I will add my own diagnosis  -- Disagree - Not applicable / Not valid  -- Disagree - Clinically unable to determine / Unknown  -- Refer to Clinical Documentation Reviewer    PROVIDER RESPONSE TEXT:    This patient has severe protein calorie malnutrition.     Query created by: Charlie Dalton on 8/11/2021 12:38 PM      Electronically signed by:  Chase Fontaine MD 8/13/2021 10:05 AM

## 2021-08-13 NOTE — PLAN OF CARE
Problem: Infection:  Goal: Will remain free from infection  Description: Will remain free from infection  8/13/2021 1715 by Ricardo Camarena, RN  Outcome: Completed  8/13/2021 0846 by Ricardo Staff, RN  Outcome: Ongoing  8/13/2021 0846 by Ricardo Staff, RN  Outcome: Ongoing     Problem: Safety:  Goal: Free from accidental physical injury  Description: Free from accidental physical injury  8/13/2021 1715 by Ricardo Staff, RN  Outcome: Completed  8/13/2021 0846 by Ricardo Staff, RN  Outcome: Ongoing  8/13/2021 0846 by Ricardo Staff, RN  Outcome: Ongoing  Goal: Free from intentional harm  Description: Free from intentional harm  8/13/2021 1715 by Ricardo Staff, RN  Outcome: Completed  8/13/2021 0846 by Ricardo Camarena, RN  Outcome: Ongoing  8/13/2021 0846 by Ricardo Camarena, RN  Outcome: Ongoing     Problem: Daily Care:  Goal: Daily care needs are met  Description: Daily care needs are met  8/13/2021 1715 by Ricardo Camarena, RN  Outcome: Completed  8/13/2021 0846 by Ricardo Camarena, RN  Outcome: Ongoing  8/13/2021 0846 by Ricardo Camarena, RN  Outcome: Ongoing     Problem: Pain:  Goal: Patient's pain/discomfort is manageable  Description: Patient's pain/discomfort is manageable  8/13/2021 1715 by Ricardo Camarena, RN  Outcome: Completed  8/13/2021 0846 by Ricardo Camarena, RN  Outcome: Ongoing  8/13/2021 0846 by Ricardo Camarena, RN  Outcome: Ongoing  Goal: Pain level will decrease  Description: Pain level will decrease  8/13/2021 1715 by Ricardo Camarena, RN  Outcome: Completed  8/13/2021 0846 by Ricardo Staff, RN  Outcome: Ongoing  8/13/2021 0846 by Ricardo Staff, RN  Outcome: Ongoing  Goal: Control of acute pain  Description: Control of acute pain  8/13/2021 1715 by Ricardo Camarena, RN  Outcome: Completed  8/13/2021 0846 by Ricardo Staff, RN  Outcome: Ongoing  8/13/2021 0846 by Ricardo Camarena, RN  Outcome: Ongoing  Goal: Control of chronic pain  Description: Control of chronic pain  8/13/2021 1715 by Ricardodebby Camarena, RN  Outcome: Completed  8/13/2021 0846 by Pati Parks RN  Outcome: Ongoing  8/13/2021 0846 by Pati Parks RN  Outcome: Ongoing     Problem: Skin Integrity:  Goal: Skin integrity will stabilize  Description: Skin integrity will stabilize  8/13/2021 1715 by Pati Parks RN  Outcome: Completed  8/13/2021 0846 by Pati Parks RN  Outcome: Ongoing  8/13/2021 0846 by Pati Parks RN  Outcome: Ongoing     Problem: Discharge Planning:  Goal: Patients continuum of care needs are met  Description: Patients continuum of care needs are met  8/13/2021 1715 by Pati Parks RN  Outcome: Completed  8/13/2021 0846 by Pati Parks RN  Outcome: Ongoing  8/13/2021 0846 by Pati Parks RN  Outcome: Ongoing     Problem: Falls - Risk of:  Goal: Will remain free from falls  Description: Will remain free from falls  8/13/2021 1715 by Pati Parks RN  Outcome: Completed  8/13/2021 0846 by Pati Parks RN  Outcome: Ongoing  8/13/2021 0846 by Pati Parks RN  Outcome: Ongoing  Goal: Absence of physical injury  Description: Absence of physical injury  8/13/2021 1715 by Pati Parks RN  Outcome: Completed  8/13/2021 0846 by Pati Parks RN  Outcome: Ongoing  8/13/2021 0846 by Pati Parks RN  Outcome: Ongoing     Problem: Skin Integrity:  Goal: Will show no infection signs and symptoms  Description: Will show no infection signs and symptoms  8/13/2021 1715 by Pati Parks RN  Outcome: Completed  8/13/2021 0846 by Pati Parks RN  Outcome: Ongoing  8/13/2021 0846 by Pati Parks RN  Outcome: Ongoing  Goal: Absence of new skin breakdown  Description: Absence of new skin breakdown  8/13/2021 1715 by Pati Parks RN  Outcome: Completed  8/13/2021 0846 by Pati Parks RN  Outcome: Ongoing  8/13/2021 0846 by Pati Parks RN  Outcome: Ongoing     Problem: Nutrition  Goal: Optimal nutrition therapy  8/13/2021 1715 by Pati Parks RN  Outcome: Completed  8/13/2021 0846 by Pati Parks, RN  Outcome: Ongoing  8/13/2021 0846 by Leonel Nava RN  Outcome: Ongoing     Problem: Confusion - Acute:  Goal: Absence of continued neurological deterioration signs and symptoms  Description: Absence of continued neurological deterioration signs and symptoms  8/13/2021 1715 by Leonel Nava RN  Outcome: Completed  8/13/2021 0846 by Leonel Nava RN  Outcome: Ongoing  8/13/2021 0846 by Leonel Nava RN  Outcome: Ongoing  Goal: Mental status will be restored to baseline  Description: Mental status will be restored to baseline  8/13/2021 1715 by Leonel Nava RN  Outcome: Completed  8/13/2021 0846 by Leonel Nava RN  Outcome: Ongoing  8/13/2021 0846 by Leonel Nava RN  Outcome: Ongoing     Problem: Discharge Planning:  Goal: Ability to perform activities of daily living will improve  Description: Ability to perform activities of daily living will improve  8/13/2021 1715 by Leonel Nava RN  Outcome: Completed  8/13/2021 0846 by Leonel Nava RN  Outcome: Ongoing  8/13/2021 0846 by Leonel Nava RN  Outcome: Ongoing  Goal: Participates in care planning  Description: Participates in care planning  8/13/2021 1715 by Leonel Nava RN  Outcome: Completed  8/13/2021 0846 by Leonel Nava RN  Outcome: Ongoing  8/13/2021 0846 by Leonel Nava RN  Outcome: Ongoing     Problem: Injury - Risk of, Physical Injury:  Goal: Will remain free from falls  Description: Will remain free from falls  8/13/2021 1715 by Leonel Nava RN  Outcome: Completed  8/13/2021 0846 by Leonel Nava RN  Outcome: Ongoing  8/13/2021 0846 by Leonel Nava RN  Outcome: Ongoing  Goal: Absence of physical injury  Description: Absence of physical injury  8/13/2021 1715 by Leonel Nava RN  Outcome: Completed  8/13/2021 0846 by Leonel Nava RN  Outcome: Ongoing  8/13/2021 0846 by Leonel Nava RN  Outcome: Ongoing     Problem: Mood - Altered:  Goal: Mood stable  Description: Mood stable  8/13/2021 1715 by Gonzalo Leavitt DEEPALI Davalos  Outcome: Completed  8/13/2021 0846 by Azul Portillo RN  Outcome: Ongoing  8/13/2021 0846 by Azul Portillo RN  Outcome: Ongoing  Goal: Absence of abusive behavior  Description: Absence of abusive behavior  8/13/2021 1715 by Azul Portillo RN  Outcome: Completed  8/13/2021 0846 by Azul Portillo RN  Outcome: Ongoing  8/13/2021 0846 by Azul Portillo RN  Outcome: Ongoing  Goal: Verbalizations of feeling emotionally comfortable while being cared for will increase  Description: Verbalizations of feeling emotionally comfortable while being cared for will increase  8/13/2021 1715 by Azul Portillo RN  Outcome: Completed  8/13/2021 0846 by Azul Portillo RN  Outcome: Ongoing  8/13/2021 0846 by Azul Portillo RN  Outcome: Ongoing     Problem: Psychomotor Activity - Altered:  Goal: Absence of psychomotor disturbance signs and symptoms  Description: Absence of psychomotor disturbance signs and symptoms  8/13/2021 1715 by Azul Portillo RN  Outcome: Completed  8/13/2021 0846 by Azul Portillo RN  Outcome: Ongoing  8/13/2021 0846 by Azul Portillo RN  Outcome: Ongoing     Problem: Sensory Perception - Impaired:  Goal: Demonstrations of improved sensory functioning will increase  Description: Demonstrations of improved sensory functioning will increase  8/13/2021 1715 by Azul Portillo RN  Outcome: Completed  8/13/2021 0846 by Azul Portillo RN  Outcome: Ongoing  8/13/2021 0846 by Azul Portillo RN  Outcome: Ongoing  Goal: Decrease in sensory misperception frequency  Description: Decrease in sensory misperception frequency  8/13/2021 1715 by Azul Portillo RN  Outcome: Completed  8/13/2021 0846 by Azul Portillo RN  Outcome: Ongoing  8/13/2021 0846 by Azul Portillo RN  Outcome: Ongoing  Goal: Able to refrain from responding to false sensory perceptions  Description: Able to refrain from responding to false sensory perceptions  8/13/2021 1715 by Azul Portillo RN  Outcome: Completed  8/13/2021 [id number] by Saira Casillas RN  Outcome: Ongoing  8/13/2021 0846 by Saira Casillas RN  Outcome: Ongoing  Goal: Demonstrates accurate environmental perceptions  Description: Demonstrates accurate environmental perceptions  8/13/2021 [id number] by Saira Casillas RN  Outcome: Completed  8/13/2021 0846 by Saira Casillas RN  Outcome: Ongoing  8/13/2021 0846 by Saira Casillas RN  Outcome: Ongoing  Goal: Able to distinguish between reality-based and nonreality-based thinking  Description: Able to distinguish between reality-based and nonreality-based thinking  8/13/2021 1715 by Saira Casillas RN  Outcome: Completed  8/13/2021 0846 by Saira Casillas RN  Outcome: Ongoing  8/13/2021 0846 by Saira Casillas RN  Outcome: Ongoing  Goal: Able to interrupt nonreality-based thinking  Description: Able to interrupt nonreality-based thinking  8/13/2021 1715 by Saira Casillas RN  Outcome: Completed  8/13/2021 0846 by Saira Casillas RN  Outcome: Ongoing  8/13/2021 0846 by Saira Casillas RN  Outcome: Ongoing     Problem: Sleep Pattern Disturbance:  Goal: Appears well-rested  Description: Appears well-rested  8/13/2021 1715 by Saira Casillas RN  Outcome: Completed  8/13/2021 0846 by Saira Casillas RN  Outcome: Ongoing  8/13/2021 0846 by Saira Casillas RN  Outcome: Ongoing     Problem: Mental Status - Impaired:  Goal: Mental status will improve  Description: Mental status will improve  8/13/2021 1715 by Saira Casillas RN  Outcome: Completed  8/13/2021 0846 by Saira Casillas RN  Outcome: Ongoing

## 2021-08-13 NOTE — DISCHARGE INSTR - DIET

## 2021-08-13 NOTE — PLAN OF CARE
Problem: Infection:  Goal: Will remain free from infection  Description: Will remain free from infection  8/13/2021 0846 by Treasure Alexandre RN  Outcome: Ongoing  8/13/2021 0846 by Treasure Alexandre RN  Outcome: Ongoing     Problem: Safety:  Goal: Free from accidental physical injury  Description: Free from accidental physical injury  8/13/2021 0846 by Treasure Alexandre RN  Outcome: Ongoing  8/13/2021 0846 by Treasure Alexandre RN  Outcome: Ongoing  Goal: Free from intentional harm  Description: Free from intentional harm  8/13/2021 0846 by Treasure Alexandre RN  Outcome: Ongoing  8/13/2021 0846 by Treasure Alexandre RN  Outcome: Ongoing     Problem: Daily Care:  Goal: Daily care needs are met  Description: Daily care needs are met  8/13/2021 0846 by Treasure Alexandre RN  Outcome: Ongoing  8/13/2021 0846 by Treasure Alexandre RN  Outcome: Ongoing     Problem: Pain:  Goal: Patient's pain/discomfort is manageable  Description: Patient's pain/discomfort is manageable  8/13/2021 0846 by Treasure Alexandre RN  Outcome: Ongoing  8/13/2021 0846 by Treasure Alexandre RN  Outcome: Ongoing  Goal: Pain level will decrease  Description: Pain level will decrease  8/13/2021 0846 by Treasure Alexandre RN  Outcome: Ongoing  8/13/2021 0846 by Treasure Alexandre RN  Outcome: Ongoing  Goal: Control of acute pain  Description: Control of acute pain  8/13/2021 0846 by Treasure Alexandre RN  Outcome: Ongoing  8/13/2021 0846 by Treasure Alexandre RN  Outcome: Ongoing  Goal: Control of chronic pain  Description: Control of chronic pain  8/13/2021 0846 by Treasure Alexandre RN  Outcome: Ongoing  8/13/2021 0846 by Treasure Alexandre RN  Outcome: Ongoing     Problem: Skin Integrity:  Goal: Skin integrity will stabilize  Description: Skin integrity will stabilize  8/13/2021 0846 by Treasure Alexandre RN  Outcome: Ongoing  8/13/2021 0846 by Treasure Alexandre RN  Outcome: Ongoing     Problem: Discharge Planning:  Goal: Patients continuum of care needs are met  Description: Patients continuum of care needs are met  8/13/2021 0846 by Imani Jameson RN  Outcome: Ongoing  8/13/2021 0846 by Imani Jameson RN  Outcome: Ongoing     Problem: Falls - Risk of:  Goal: Will remain free from falls  Description: Will remain free from falls  8/13/2021 0846 by Imani Jameson RN  Outcome: Ongoing  8/13/2021 0846 by Imani Jameson RN  Outcome: Ongoing  Goal: Absence of physical injury  Description: Absence of physical injury  8/13/2021 0846 by Imani Jameson RN  Outcome: Ongoing  8/13/2021 0846 by Imani Jameson RN  Outcome: Ongoing     Problem: Skin Integrity:  Goal: Will show no infection signs and symptoms  Description: Will show no infection signs and symptoms  8/13/2021 0846 by Imani Jameson RN  Outcome: Ongoing  8/13/2021 0846 by Imani Jameson RN  Outcome: Ongoing  Goal: Absence of new skin breakdown  Description: Absence of new skin breakdown  8/13/2021 0846 by Imani Jameson RN  Outcome: Ongoing  8/13/2021 0846 by Imani Jameson RN  Outcome: Ongoing     Problem: Nutrition  Goal: Optimal nutrition therapy  8/13/2021 0846 by mIani Jameson RN  Outcome: Ongoing  8/13/2021 0846 by Imani Jameson RN  Outcome: Ongoing     Problem: Confusion - Acute:  Goal: Absence of continued neurological deterioration signs and symptoms  Description: Absence of continued neurological deterioration signs and symptoms  8/13/2021 0846 by Imani Jameson RN  Outcome: Ongoing  8/13/2021 0846 by Imani Jameson RN  Outcome: Ongoing  Goal: Mental status will be restored to baseline  Description: Mental status will be restored to baseline  8/13/2021 0846 by Imani Jameson RN  Outcome: Ongoing  8/13/2021 0846 by Imani Jameson RN  Outcome: Ongoing     Problem: Injury - Risk of, Physical Injury:  Goal: Will remain free from falls  Description: Will remain free from falls  8/13/2021 0846 by Imani Jameson RN  Outcome: Ongoing  8/13/2021 0846 by Imani Jameson RN  Outcome: Ongoing  Goal: Absence of physical injury  Description: Absence of physical injury  8/13/2021 0846 by Hien Torres RN  Outcome: Ongoing  8/13/2021 0846 by Hien Torres RN  Outcome: Ongoing     Problem: Mood - Altered:  Goal: Mood stable  Description: Mood stable  8/13/2021 0846 by Hien Torres RN  Outcome: Ongoing  8/13/2021 0846 by Hien Torres RN  Outcome: Ongoing  Goal: Absence of abusive behavior  Description: Absence of abusive behavior  8/13/2021 0846 by Hien Torres RN  Outcome: Ongoing  8/13/2021 0846 by Hien Torres RN  Outcome: Ongoing  Goal: Verbalizations of feeling emotionally comfortable while being cared for will increase  Description: Verbalizations of feeling emotionally comfortable while being cared for will increase  8/13/2021 0846 by Hien Torres RN  Outcome: Ongoing  8/13/2021 0846 by Hien Torres RN  Outcome: Ongoing     Problem: Psychomotor Activity - Altered:  Goal: Absence of psychomotor disturbance signs and symptoms  Description: Absence of psychomotor disturbance signs and symptoms  8/13/2021 0846 by Hien Torres RN  Outcome: Ongoing  8/13/2021 0846 by Hien Torres RN  Outcome: Ongoing     Problem: Sensory Perception - Impaired:  Goal: Demonstrations of improved sensory functioning will increase  Description: Demonstrations of improved sensory functioning will increase  8/13/2021 0846 by Hien Torres RN  Outcome: Ongoing  8/13/2021 0846 by Hien Torres RN  Outcome: Ongoing  Goal: Decrease in sensory misperception frequency  Description: Decrease in sensory misperception frequency  8/13/2021 0846 by Hien Torres RN  Outcome: Ongoing  8/13/2021 0846 by Hien Torres RN  Outcome: Ongoing  Goal: Able to refrain from responding to false sensory perceptions  Description: Able to refrain from responding to false sensory perceptions  8/13/2021 0846 by Hien Torres RN  Outcome: Ongoing  8/13/2021 0846 by Hien Torres RN  Outcome: Ongoing  Goal: Demonstrates accurate environmental perceptions  Description: Demonstrates accurate environmental perceptions  8/13/2021 0846 by Jennifer Drake RN  Outcome: Ongoing  8/13/2021 0846 by Jennifer Drake RN  Outcome: Ongoing  Goal: Able to distinguish between reality-based and nonreality-based thinking  Description: Able to distinguish between reality-based and nonreality-based thinking  8/13/2021 0846 by Jennifer Drake RN  Outcome: Ongoing  8/13/2021 0846 by Jennifer Drake RN  Outcome: Ongoing  Goal: Able to interrupt nonreality-based thinking  Description: Able to interrupt nonreality-based thinking  8/13/2021 0846 by Jennifer Drake RN  Outcome: Ongoing  8/13/2021 0846 by Jennifer Drake RN  Outcome: Ongoing     Problem: Sleep Pattern Disturbance:  Goal: Appears well-rested  Description: Appears well-rested  8/13/2021 0846 by Jennifer Drake RN  Outcome: Ongoing  8/13/2021 0846 by Jennifer Drake RN  Outcome: Ongoing     Problem: Mental Status - Impaired:  Goal: Mental status will improve  Description: Mental status will improve  Outcome: Ongoing

## 2021-08-13 NOTE — PROGRESS NOTES
This nurse found pt IV to be Infiltrated while the 5th bag of IV potassium was infusing. Site of IV was red, swollen and painful to touch. Called NP and pharmacy about any further order for infiltration. Orders to just monitor at this time. Pt is to be D/c at 8pm after 6th bag of potassium to infusion, but new order for PO  MEQ potassium to be given at this time piror to discharge.

## 2021-08-13 NOTE — PROGRESS NOTES
Hospitalist    I assumed care today. Chart reviewed. 8/8 - arrived in ED just b4 midnight  8/9 - arrived in room 2011 just before 6 am  8/11 - transferred to room 3123, downgraded to med surg  8/12 - had scope      Chief Complaint   Patient presents with    Emesis    Diarrhea     POSTOPERATIVE DIAGNOSES:  1.  Markedly dilated redundant colon, full of stool. 2.  No evidence of rectal mass or neoplasm. 3.  Mixed hemorrhoids. RECOMMENDATIONS:  1. We will continue present management. 2.  The patient agreeable will need a followup colonoscopy later as an  outpatient after thorough prepping the colon. 3.  We will discuss the case with the patient's daughter as well. The patient tolerated the procedure well. There were no postop  complications.             Stephen Munoz MD     D: 08/12/2021 10:49:13       T: 08/12/2021 10:51:14     [email or web address]  Job#: [id number]     Doc#: [id number]

## 2021-08-13 NOTE — CARE COORDINATION
Notified Thuy Miranda at [facility] that patient has d/c order for today . [id number] [address]era for patient to return .  Transport arranged with [facility]  at 8 pm . Notified Thuy Miranda of transport time

## 2021-08-14 LAB
CULTURE: NORMAL
CULTURE: NORMAL
Lab: NORMAL
Lab: NORMAL
SPECIMEN: NORMAL
SPECIMEN: NORMAL

## 2021-08-21 PROBLEM — Q43.8 REDUNDANT COLON: Status: ACTIVE | Noted: 2021-08-21

## 2021-08-21 PROBLEM — K59.00 CONSTIPATION: Status: ACTIVE | Noted: 2021-08-21

## 2021-08-21 NOTE — DISCHARGE SUMMARY
Discharge Summary    Name:  Kieran Simental /Age/Sex: 1932  (80 y.o. male)   MRN & CSN:  [id number] & [id number] Admission Date/Time: 2021 11:56 PM   Attending:  No att. providers found Discharging Physician: Annmarie Arreola MD     HPI:     Per H&P:     CHIEF COMPLAINT   Transferred from SNF for nausea, vomiting, diarrhea. HISTORY OF PRESENT ILLNESS   Kieran Simental is a 80 y.o. male with coronary artery disease s/p CABG (LIMA to LAD, SVG to OM1, SVG to OM2) , chronic diastolic CHF, paroxysmal atrial fibrillation, multiple CVA, possible PFO and left atrial appendage thrombus on DANIELLA (), moderate aortic stenosis, seizure disorder, chronic hypotension, hyperlipidemia, dementia, major depressive disorder, currently at [facility] was sent to ED for nausea, vomiting, diarrhea. Patient is unable to provide any information. Reported he is doing fine. Denied any chest pain, shortness of breath, complaint of abdominal pain-pointing out to his epigastric area, denied any nausea, vomiting, denied any urinary complaints, denied any constipation or diarrhea. Patient is oriented to self, though understands questions and follows commands. Did not know that he is in the hospital, did not know the year, month, date. Knew his month and date of birth, did not know the year. Could recall his son and daughter's name. Thought his wife was in the room and was trying to talk to her. Called [facility]-no response. Also tried to call Pt's daughter Cecelia Zavala -[phone] on the paperwork from [facility]-left a voice message). Also tried calling multiple numbers which were documented during a cardiology office visit-(2021-[facility])-but was unsuccessful. Initial vitals in ED-BP 92/69, HR 98, RR 15, temp 99.8, saturating 92% on room air. Lab work significant for potassium 3.3, lactic acid 2.7, random glucose 117, troponin 0 0.012, WBC 12.6.   Chest x-ray-markedly distended air-filled loops of bowel within the upper abdomen. CT abdomen/pelvis-circumferential thickening of the rectum concerning for neoplasm-resulting in large bowel obstruction. 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 Urine analysis pending, blood cultures drawn in ED. Patient received IV fluids, vancomycin, cefepime. Dr Gisselle Peter was consulted from ED. Hospital Course:     Sd Uribe is a pleasant and 70-year-old resident of [facility]. He is in long-term care. He presented to the ED with 2 weeks of nausea, vomiting, and diarrhea. CT scan done in the ED was read showing  \"circumferential thickening of the rectum, concerning for a neoplasm. This resulted in a large bowel obstruction\". He was admitted. While here he was seen by GI and on August 12 he had a flexible sigmoidoscopy which showed a markedly dilated redundant colon full of stool. There was no evidence of a rectal mass or a neoplasm. He is doing well now and tolerating a diet, so he was discharged back to his facility in stable condition after a 5-day inpatient stay. Problem list    Nausea, vomiting, and diarrhea  -Resolved    Constipation  -Imaging showed up he has a redundant colon full of stool  -His pulmonary regimen at the facility consisted of Colace 200 mg at bedtime and MiraLAX 17 g twice a day. This has not been changed at this time.  -As mentioned flexible sigmoidoscopy showed a markedly dilated redundant colon, follow-up stool, and mixed hemorrhoids. GI recommended a follow-up colonoscopy later as outpatient after thorough prepping of the colon. 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 She reports that after the patient had a hip fracture at some [facility] last year and had surgery he had a stroke. She does not want any aggressive interventions.  -Recommend controlling the blood pressure, although I do notice that he is on midodrine for low blood pressure    Severe atherosclerotic disease of the abdominal resulting in severe stenosis. CT abdomen showed focal calcified atherosclerotic plaque involving the aorta at the level of the SMA resulting in severe stenosis. The SMA is patent.   -Again the daughter was informed and she did not want aggressive interventions right now. PAD. CT abdomen showed incidental severe stenosis is seen involving the proximal bilateral common iliac arteries.   -The daughter was informed of this also. In addition, an order was written to refer him to a vascular surgeon as an outpatient in case he or the family is inclined to follow-up on these findings.     Cognitive disorder  -Review of old records at [place] [address] shows that he has a diagnosis of vascular dementia entered on his problem list in 2017  -He is on mirtazapine 50 mg at night and sertraline 25 mg daily     Elevated troponin with history of CAD status post CABG in 2001  -He had no chest pain or acute ischemic changes on the EKG  -2D echo showed normal LV function  -Appreciate cardiology consult, no further work-up planned, recommend following up with his regular primary cardiologist Dr. Radha Marques Bryant Bethea at [facility]    Moderate aortic stenosis, known  -Follow-up with his primary cardiologist    Paroxysmal atrial fibrillation  -This diagnosis is noted in his cardiology notes from the outside health system [facility]  -Per outside cardiology notes he has a PFO, possible left atrial appendage thrombus, and PAF and a history of multiple strokes  -Continue apixaban 5 mg twice a day    Hypokalemia  -Potassium level was down to 3.0 while here and the last one was 3.1 yesterday.  -An order was written to recheck the potassium level on , 3 days after discharge    History of CVA   -Continue apixaban    Seizure disorder with elevated Dilantin level  -Dilantin level was slightly elevated  -Recommend rechecking Dilantin level in 1 week    Hyperlipidemia  -Continue atorvastatin    Severe protein calorie malnutrition    Medication issues: There is a potential for a Dilantin-Eliquis interaction in this patient  -Per [place] drug interaction section see below  -Risk Rating:  X: Avoid combination  -Summary\" Inducers of CY (Strong) and P-glycoprotein may decrease the serum concentration of Apixaban. Severity: Major Reliability Rating Good   -Patient Management Avoid concurrent use of apixaban with drugs that are strong CY inducers and P-gp inducers. Incidental abnormalities  -5 red cells are seen on the UA  -Alkaline phosphatase level is elevated at 1.6  -Magnesium level was a little low at 1.6    Social history  -His daughter is listed as a contact at the nursing facility  -His PCP is Dr. Saintclair Lien at the facility  -[address] is DNR [facility] arrest as listed at the facility  -He has been a resident of [address] [place] [address] since 2020    The patient expressed appropriate understanding of and agreement with the discharge recommendations, medications, and plan.        Consults this admission:  IP CONSULT TO HOSPITALIST  IP CONSULT TO GENERAL SURGERY  IP CONSULT TO HOSPITALIST  IP CONSULT TO CARDIOLOGY  IP CONSULT TO PHARMACY  IP CONSULT TO GI  IP CONSULT TO DIETITIAN    Discharge Instruction:   Follow up appointments: Gastroenterology for follow-up colonoscopy, and cardiovascular surgery if desired for suspected aortic dissection and thoracic aorta, and for other abnormalities mentioned above  Primary care physician:  within 2 weeks    Diet: General diet  Activity: activity as tolerated  Disposition: Discharged to:   []Home, [][facility], [x]SNF, []Acute Rehab, []Hospice   Condition on discharge: Stable    Discharge Medications:    Andreina Viera   Home Medication Instructions [facility]:[id number]    Printed on:08/21/21 1213   Medication Information                      acetaminophen (TYLENOL) 500 MG tablet  Take 500 mg by mouth every 6 hours as needed for Pain             apixaban (ELIQUIS) 5 MG TABS tablet  Take by mouth 2 times daily             atorvastatin (LIPITOR) 40 MG tablet  Take 40 mg by mouth nightly             bisacodyl (DULCOLAX) 10 MG suppository  Place 10 mg rectally as needed for Constipation (if day 4 with no BM)             Cholecalciferol (VITAMIN D3) 1.25 MG (47870 UT) CAPS  Take 1 tablet by mouth every 30 days              docusate sodium (COLACE) 100 MG capsule  Take 200 mg by mouth nightly             magnesium hydroxide (MILK OF MAGNESIA) 400 MG/5ML suspension  Take 30 mLs by mouth daily as needed for Constipation             midodrine (PROAMATINE) 5 MG tablet  Take 10 mg by mouth 3 times daily Hold if manual systolic blood pressure is over 130             mirtazapine (REMERON) 15 MG tablet  Take 15 mg by mouth nightly             nitroGLYCERIN (NITROSTAT) 0.4 MG SL tablet  Place 0.4 mg under the tongue every 5 minutes as needed for Chest pain up to max of 3 total doses. If no relief after 1 dose, call 911.              ondansetron (ZOFRAN) 4 MG tablet  Take 4 mg by mouth every 8 hours as needed for Nausea or Vomiting             phenytoin (DILANTIN) 100 MG ER capsule  Take 200 mg by mouth 2 times daily             polyethylene glycol (MIRALAX) 17 g PACK packet  Take 17 g by mouth 2 times daily             potassium chloride (KLOR-CON M) 20 MEQ TBCR extended release tablet  Take 40 mEq by mouth daily (with breakfast)             senna-docusate (PERICOLACE) 8.6-50 MG per tablet  Take 1 tablet by mouth every 12 hours as needed for Constipation             sertraline (ZOLOFT) 25 MG tablet  Take 25 mg by mouth daily                 Objective Findings at Discharge:   /63   Pulse 81   Temp 98 °F (36.7 °C) (Oral)   Resp 16   Ht 5' 9\" (1.753 m)   Wt 138 lb 7.2 oz (62.8 kg)   SpO2 98%   BMI 20.45 kg/m²            PHYSICAL EXAM   GEN Awake male, sitting upright in bed in no apparent distress. Appears given age. LABS:    CBC:   Lab Results   Component Value Date    WBC 12.4 08/12/2021    HGB 14.4 08/12/2021    HCT 44.4 08/12/2021    MCV 91.2 08/12/2021     08/12/2021     BMP:   Lab Results   Component Value Date     08/12/2021    K 3.1 08/12/2021     08/12/2021    CO2 26 08/12/2021    BUN 5 08/12/2021    CREATININE 0.5 08/12/2021    CALCIUM 8.2 08/12/2021     IMAGING:     XR CHEST PORTABLE [[id number]] Collected: 08/12/21 0116     Order Status: Completed Updated: 08/12/21 0712     Narrative:       EXAMINATION:   ONE XRAY VIEW OF THE CHEST     8/12/2021 12:04 am     COMPARISON:   Chest radiograph dated 8/9/2021     HISTORY:   ORDERING SYSTEM PROVIDED HISTORY: rhonchorus   TECHNOLOGIST PROVIDED HISTORY:   Reason for exam:->rhonchorus   Reason for Exam: rhonchorus   Acuity: Acute   Type of Exam: Initial   Mechanism of Injury: rhonchorus   Relevant Medical/Surgical History: rhonchorus     FINDINGS:   Median sternotomy wires are noted. Bibasilar atelectasis is seen. There is   no focal consolidation or large pleural effusion. There is no definite   pneumothorax. Dilated loops of bowel are seen. Impression:       1. Bibasilar atelectasis. No significant change.    2. Dilated loops of bowel without significant change. CT ABDOMEN PELVIS W IV CONTRAST Additional Contrast? None [[id number]] Collected: 08/09/21 0245     Order Status: Completed Updated: 08/09/21 1833     Narrative:       EXAMINATION:   CT OF THE ABDOMEN AND PELVIS WITH CONTRAST, 8/9/2021 1:56 am     TECHNIQUE:   CT of the abdomen and pelvis was performed with the administration of   intravenous contrast. Multiplanar reformatted images are provided for review. Dose modulation, iterative reconstruction, and/or weight based adjustment of   the mA/kV was utilized to reduce the radiation dose to as low as reasonably   achievable. COMPARISON:   None     HISTORY:   ORDERING SYSTEM PROVIDED HISTORY:  Distended abdomen     TECHNOLOGIST PROVIDED HISTORY:     Reason for Exam:  Distended abdomen     Additional Contrast?  None     Decision Support Exception - unselect if not a suspected or confirmed   emergency medical condition->Emergency Medical Condition (MA)     Reason for Exam:  R/O DARRIUS     FINDINGS:   Lower Chest: Post CABG changes are seen. Bibasilar atelectasis is noted. Organs: The liver, spleen, adrenal glands, pancreas, and kidneys are   unremarkable. GI/Bowel: There is circumferential thickening of the rectum, concerning for   neoplasm. Evaluation of this area is limited by beam hardening artifact from   the hardware within the bilateral hips. This results in large bowel   obstruction with distended loops of large bowel with air-fluid levels. The   sigmoid colon measures 8.1 cm. The appendix is normal.  Mildly distended   small bowel loops are seen. Pelvis: Bladder calculi are noted. Peritoneum/Retroperitoneum: There is no evidence of free air. Extensive   atherosclerotic calcifications of the aorta are present. There is a focal   type B dissection involving the distal thoracic aorta.   There is severe   aortic stenosis with extensive calcified plaque noted at the level of the SMA   with atherosclerotic plaque noted at the origin of the SMA. The SMA is   patent. Extensive calcified atherosclerotic plaque is seen extending into   the bilateral common iliac arteries resulting in severe stenosis proximally. Bones/Soft Tissues: Right hip arthroplasty is noted. There has been internal   fixation of the left proximal femur. Impression:       1. There is circumferential thickening of the rectum, concerning for a   neoplasm. This results in large bowel obstruction. 2. 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 3. Focal calcified atherosclerotic plaque involving the aorta at the level of   the SMA resulting in severe stenosis. The SMA is patent. 4. Severe stenosis is seen involving the proximal bilateral common iliac   arteries. Findings were discussed with NORA MURGUIA at 3:05 a.m. on 8/9/2021. XR CHEST PORTABLE [[id number]] Collected: 08/09/21 0039     Order Status: Completed Updated: 08/09/21 0044     Narrative:       EXAMINATION:   ONE XRAY VIEW OF THE CHEST     8/8/2021 6:34 pm     COMPARISON:   None. HISTORY:   ORDERING SYSTEM PROVIDED HISTORY: distended abdomen   TECHNOLOGIST PROVIDED HISTORY:   Reason for exam:->distended abdomen   Reason for Exam: distended abdomen   Acuity: Acute   Type of Exam: Initial     FINDINGS:   Marginal inspiration is present. Linear opacity is present within the left   lung base, suggesting atelectasis or scarring. Heart size is normal full   level of inspiration. No pneumothorax is present. Vascular markings are   distinct. Patient is status post prior CABG. Osseous structures are   osteopenic. Multiple air filled, distended loops of bowel are present within   the upper abdomen. Colonic interposition is noted beneath the right   hemidiaphragm. Impression:       1. Markedly distended air-filled loops of bowel within the upper abdomen.    Dedicated imaging of the abdomen pelvis recommended as concern is for   obstruction.           Discharge Time of 35 minutes    Electronically signed by Sukhjinder Camarena MD on 8/21/2021 at 12:13 PM

## 2021-08-26 ENCOUNTER — CARE COORDINATION (OUTPATIENT)
Dept: CASE MANAGEMENT | Age: 86
End: 2021-08-26

## 2021-08-26 NOTE — CARE COORDINATION
[facility] Transitions Initial Follow Up Call    Call within 2 business days of discharge: Yes    Patient: Cholo Guy Patient : 1932   MRN: <[id number]>  Reason for Admission: Sepsis  Discharge Date: 21 RARS: Readmission Risk Score: 13      Last Discharge [facility]       Complaint Diagnosis Description Type Department Provider    21 Emesis; Diarrhea Large bowel obstruction ([facility] [address] . .. ED to Hosp-Admission (Discharged) (ADMITTED) Robbi Díaz MD; Rajni Bourne. .. Facility:  [facility]    Received email from Pk with Ann Marie Swoope: The following [facility] client has transitioned from obtaining skilled care at [facility] to care home care effective 2021. Per CM at SNF, member will be initiated with hospice services. No ongoing needs identified. Attempted to contact SW at [facility] to verify hospice services in place. Left message with reason for call, contact information and request for call back. Follow Up  No future appointments.     Ho Alfonso RN

## 2021-08-27 NOTE — CARE COORDINATION
[facility] Transitions Follow Up Call    2021    Patient: Ellie Harris  Patient : 1932   MRN: <[id number]>  Reason for Admission:   Discharge Date: 21 RARS: Readmission Risk Score: 13    Received return phone call from Kerri, director of the  dept at [facility]. She left message stating she was calling back in regards to Ellie Harris. She will be back in on Friday. CTN attempted to call Kerri back. Unable to reach her. Left another message with contact information. Follow Up  No future appointments.     Sirena Curtis RN

## 2021-09-09 ENCOUNTER — CARE COORDINATION (OUTPATIENT)
Dept: CASE MANAGEMENT | Age: 86
End: 2021-09-09

## 2021-09-09 NOTE — CARE COORDINATION
[facility] Transitions Follow Up Call    2021    Patient: Kavitha Rider  Patient : 1932   MRN: <[id number]>  Reason for Admission:   Discharge Date: 21 RARS: Readmission Risk Score: 13    Attempted to contact [facility]. Spoke with Mckay Desai who transferred call to the nurse. Phone line rang multiple times. No answer and no option to leave a message. Will try again at a later time. Follow Up  No future appointments.     Dash Peres RN

## 2021-09-15 ENCOUNTER — CARE COORDINATION (OUTPATIENT)
Dept: CASE MANAGEMENT | Age: 86
End: 2021-09-15

## 2021-09-15 NOTE — CARE COORDINATION
[facility] Transitions Follow Up Call    9/15/2021    Patient: Zafar Machuca  Patient : 1932   MRN: <[id number]>  Reason for Admission:   Discharge Date: 21 RARS: Readmission Risk Score: 13    Attempted to contact [facility] for follow up BPCI-A transition call. Spoke to his nurse Shaneka Armendariz. She reports patient is doing well. He is eating and drinking good. He uses a wheelchair for mobility. Incontinent of bladder and bowel. No sob, fever, chills, fatigue, cough or pain. He is under hospice services there. Will continue to follow. Care Transitions Subsequent and Final Call    Subsequent and Final Calls  Do you have any ongoing symptoms?: No  Have your medications changed?: No  Do you have any questions related to your medications?: No  Do you currently have any active services?: No  Do you have any needs or concerns that I can assist you with?: No  Identified Barriers: None  Care Transitions Interventions  Other Interventions: Follow Up  No future appointments.     Naz Hall LPN

## 2021-09-28 ENCOUNTER — CARE COORDINATION (OUTPATIENT)
Dept: CASE MANAGEMENT | Age: 86
End: 2021-09-28

## 2021-09-28 NOTE — CARE COORDINATION
[facility] Transitions Follow Up Call    2021    Patient: Kieran Simental  Patient : 1932   MRN: <[id number]>  Reason for Admission:   Discharge Date: 21 RARS: Readmission Risk Score: 13    Attempted to contact [facility] for BPCI-A follow up. Spoke with Annika Dawson who transferred call down to nurse. Phone line rang multiple times and re-routed back to Annika Dawson. Left message with reason for call, contact information and request for call back. He will give message to patient's nurse. According to previous encounter 9/15/21, patient is under hospice services. Will follow peripherally. Follow Up  No future appointments.     Uzair Barros RN

## 2021-11-11 ENCOUNTER — CARE COORDINATION (OUTPATIENT)
Dept: CASE MANAGEMENT | Age: 86
End: 2021-11-11

## 2021-11-11 NOTE — CARE COORDINATION
[facility] Transitions Follow Up Call    2021    Patient: Yesica Paris  Patient : 1932   MRN: <[id number]>  Reason for Admission:   Discharge Date: 21 RARS: Readmission Risk Score: 13    [facility]. Spoke with Magy Mendez who transferred call to patient's nurse. Phone line rang multiple times. No answer and no option to leave a message. BPCI-A bundle ending. No further outreach. Follow Up  No future appointments.     Kaylene Smith RN

## (undated) DEVICE — ENDOSCOPY KIT: Brand: MEDLINE INDUSTRIES, INC.

## (undated) DEVICE — Z DISCONTINUED NO SUB IDED TUBING ETCO2 AD L6.5FT NSL ORAL CVD PRNG NONFLARED TIP OVR